# Patient Record
Sex: FEMALE | Race: WHITE | NOT HISPANIC OR LATINO | URBAN - METROPOLITAN AREA
[De-identification: names, ages, dates, MRNs, and addresses within clinical notes are randomized per-mention and may not be internally consistent; named-entity substitution may affect disease eponyms.]

---

## 2017-07-01 ENCOUNTER — OUTPATIENT (OUTPATIENT)
Dept: OUTPATIENT SERVICES | Facility: HOSPITAL | Age: 82
LOS: 1 days | End: 2017-07-01
Payer: MEDICAID

## 2017-07-18 DIAGNOSIS — R69 ILLNESS, UNSPECIFIED: ICD-10-CM

## 2017-08-01 PROCEDURE — G9001: CPT

## 2018-06-07 ENCOUNTER — NON-APPOINTMENT (OUTPATIENT)
Age: 83
End: 2018-06-07

## 2018-06-07 ENCOUNTER — APPOINTMENT (OUTPATIENT)
Dept: INTERNAL MEDICINE | Facility: CLINIC | Age: 83
End: 2018-06-07
Payer: MEDICARE

## 2018-06-07 VITALS
BODY MASS INDEX: 37.29 KG/M2 | WEIGHT: 185 LBS | HEART RATE: 88 BPM | SYSTOLIC BLOOD PRESSURE: 132 MMHG | HEIGHT: 59 IN | DIASTOLIC BLOOD PRESSURE: 79 MMHG

## 2018-06-07 DIAGNOSIS — Z00.00 ENCOUNTER FOR GENERAL ADULT MEDICAL EXAMINATION W/OUT ABNORMAL FINDINGS: ICD-10-CM

## 2018-06-07 PROCEDURE — 36415 COLL VENOUS BLD VENIPUNCTURE: CPT

## 2018-06-07 PROCEDURE — 93000 ELECTROCARDIOGRAM COMPLETE: CPT | Mod: 59

## 2018-06-07 PROCEDURE — G0438: CPT

## 2018-06-11 NOTE — REVIEW OF SYSTEMS
[Joint Pain] : joint pain [Muscle Pain] : muscle pain [Negative] : Psychiatric [FreeTextEntry9] : Joint pains

## 2018-06-11 NOTE — HISTORY OF PRESENT ILLNESS
[de-identified] : This is an 82 year old woman who presents for CPE and change in primary.  Previously seeing Dr. Hernandez last visit 2-3 weeks ago needed new primary as he retired.  \par \par Hx of rheumatoid arthritis, has been worse over the past few months.  Goes to Dr. Etienne\par ALso knee replacement last surgery. Tuboligation.  \par Right leg knee replacement last January, left knee this august.  \par \par Severe pain in knee, hand shoulders.  Had a cortisone shot for her left shoulder. \par prednisone 5mg 2 a day.\par Tramadol 50mg takes 2-3 a day.\par Triamterene HCTZ for swelling.  \par \par Gained 10-15 lbs last year.

## 2018-06-11 NOTE — ASSESSMENT
[FreeTextEntry1] : 82 year old woman with history of rheumatoid arthritis, its being getting worse lately.  Patient on prednisone however having problems with her hands and shoulder pains. Reccomended she discuss possibility of a biologic or methotrexate with her rheumatologist.  Patient gaining a fair amount of  weight would not push prednisone to too high of a dose.   Check CBC, CMP, HgA1c, Lipid profile, TSH, Vitamin D, UA, Rheumatoid factor, ESR, Ferritin.

## 2018-06-12 LAB
25(OH)D3 SERPL-MCNC: 37.5 NG/ML
ALBUMIN SERPL ELPH-MCNC: 4.1 G/DL
ALP BLD-CCNC: 51 U/L
ALT SERPL-CCNC: 9 U/L
ANION GAP SERPL CALC-SCNC: 17 MMOL/L
AST SERPL-CCNC: 21 U/L
BACTERIA UR CULT: NORMAL
BASOPHILS # BLD AUTO: 0.02 K/UL
BASOPHILS NFR BLD AUTO: 0.2 %
BILIRUB SERPL-MCNC: 0.3 MG/DL
BUN SERPL-MCNC: 33 MG/DL
CALCIUM SERPL-MCNC: 10.3 MG/DL
CHLORIDE SERPL-SCNC: 96 MMOL/L
CO2 SERPL-SCNC: 24 MMOL/L
CREAT SERPL-MCNC: 1.55 MG/DL
EOSINOPHIL # BLD AUTO: 0.07 K/UL
EOSINOPHIL NFR BLD AUTO: 0.6 %
ERYTHROCYTE [SEDIMENTATION RATE] IN BLOOD BY WESTERGREN METHOD: 38 MM/HR
FERRITIN SERPL-MCNC: 192 NG/ML
FOLATE SERPL-MCNC: >20 NG/ML
GLUCOSE SERPL-MCNC: 74 MG/DL
HBA1C MFR BLD HPLC: 5.8 %
HCT VFR BLD CALC: 46.3 %
HGB BLD-MCNC: 14.4 G/DL
IMM GRANULOCYTES NFR BLD AUTO: 0.2 %
IRON SATN MFR SERPL: 28 %
IRON SERPL-MCNC: 74 UG/DL
LYMPHOCYTES # BLD AUTO: 4.41 K/UL
LYMPHOCYTES NFR BLD AUTO: 37.4 %
MAN DIFF?: NORMAL
MCHC RBC-ENTMCNC: 28.3 PG
MCHC RBC-ENTMCNC: 31.1 GM/DL
MCV RBC AUTO: 91.1 FL
MONOCYTES # BLD AUTO: 0.91 K/UL
MONOCYTES NFR BLD AUTO: 7.7 %
NEUTROPHILS # BLD AUTO: 6.36 K/UL
NEUTROPHILS NFR BLD AUTO: 53.9 %
PLATELET # BLD AUTO: 314 K/UL
POTASSIUM SERPL-SCNC: 4.1 MMOL/L
PROT SERPL-MCNC: 7.6 G/DL
RBC # BLD: 5.08 M/UL
RBC # FLD: 13.8 %
RHEUMATOID FACT SER QL: 18 IU/ML
SAVE SPECIMEN: NORMAL
SODIUM SERPL-SCNC: 137 MMOL/L
T3FREE SERPL-MCNC: 2.2 PG/ML
T4 SERPL-MCNC: 7.3 UG/DL
TIBC SERPL-MCNC: 263 UG/DL
TSH SERPL-ACNC: 3.41 UIU/ML
UIBC SERPL-MCNC: 189 UG/DL
URATE SERPL-MCNC: 6.4 MG/DL
VIT B12 SERPL-MCNC: 802 PG/ML
WBC # FLD AUTO: 11.79 K/UL

## 2018-07-26 ENCOUNTER — APPOINTMENT (OUTPATIENT)
Dept: INTERNAL MEDICINE | Facility: CLINIC | Age: 83
End: 2018-07-26
Payer: MEDICARE

## 2018-07-26 VITALS
HEART RATE: 87 BPM | SYSTOLIC BLOOD PRESSURE: 174 MMHG | BODY MASS INDEX: 41.75 KG/M2 | WEIGHT: 183 LBS | DIASTOLIC BLOOD PRESSURE: 102 MMHG | HEIGHT: 55.5 IN

## 2018-07-26 VITALS — DIASTOLIC BLOOD PRESSURE: 94 MMHG | SYSTOLIC BLOOD PRESSURE: 156 MMHG

## 2018-07-26 PROCEDURE — 99214 OFFICE O/P EST MOD 30 MIN: CPT

## 2018-07-27 NOTE — ASSESSMENT
[Patient Optimized for Surgery] : Patient optimized for surgery [FreeTextEntry4] : This is an 83 year old woman with severe rheumatoid arthritis, she presents for pre-surgical medical clearance prior to a left knee replacement. No history of coronary artery disease or diabetes.  Pre-op bloodwork looks fin,e CBC, CMP WNL EKG unremarkable.  Patient has no cardiac history, and had a workup last year that was used to clear her for the right knee replacement. No complications were noted and patient did well, however the results are not available to me.  Given that she has no prior heart history she is low risk and this would be unnecessary. Patient is medically cleared and optimized for upcoming left knee replacement after starting atenolol 50mg daily for some hypertension that we have only recently noted on today visit. May exacerbated by knee pain. Will re-evaluate after surgery to see if she still needs it.

## 2018-07-27 NOTE — HISTORY OF PRESENT ILLNESS
[Coronary Artery Disease] : no coronary artery disease [Diabetes] : no diabetes [Sleep Apnea] : no sleep apnea [Previous Adverse Anesthesia Reaction] : no previous adverse anesthesia reaction [FreeTextEntry1] : Left knee replacement.   [FreeTextEntry2] : 8/2/18 [FreeTextEntry3] : Dr. Herve Echavarria [FreeTextEntry4] : This is an 83 year old woman who presents for pre-operative clearance prior to a left knee replacement.  Going for the left knee replacement surgery with Dr. Herve Echavarria on 8/2/18.  Right side was done last year has since recovered.  At he time she had a stress test and an echocardiogram as part of the pre-op workup.  Those records are now unavailable to us, her physician had since retired.  \par She has a histroy of hypertension on triamterene HCTZ 37.5/25 and is on prednisone for rheumatoid arthritis.  \par \par Dr. Edmonds

## 2018-07-27 NOTE — PHYSICAL EXAM

## 2018-09-01 ENCOUNTER — OUTPATIENT (OUTPATIENT)
Dept: OUTPATIENT SERVICES | Facility: HOSPITAL | Age: 83
LOS: 1 days | End: 2018-09-01
Payer: MEDICARE

## 2018-09-01 PROCEDURE — G9001: CPT

## 2018-09-05 DIAGNOSIS — Z71.89 OTHER SPECIFIED COUNSELING: ICD-10-CM

## 2018-09-21 ENCOUNTER — RX RENEWAL (OUTPATIENT)
Age: 83
End: 2018-09-21

## 2019-08-27 ENCOUNTER — NON-APPOINTMENT (OUTPATIENT)
Age: 84
End: 2019-08-27

## 2019-08-27 ENCOUNTER — APPOINTMENT (OUTPATIENT)
Dept: CARDIOLOGY | Facility: CLINIC | Age: 84
End: 2019-08-27
Payer: MEDICARE

## 2019-08-27 VITALS
DIASTOLIC BLOOD PRESSURE: 82 MMHG | BODY MASS INDEX: 42.9 KG/M2 | WEIGHT: 188 LBS | SYSTOLIC BLOOD PRESSURE: 174 MMHG | HEART RATE: 75 BPM | HEIGHT: 55.5 IN | OXYGEN SATURATION: 96 %

## 2019-08-27 DIAGNOSIS — Z82.49 FAMILY HISTORY OF ISCHEMIC HEART DISEASE AND OTHER DISEASES OF THE CIRCULATORY SYSTEM: ICD-10-CM

## 2019-08-27 PROCEDURE — 99205 OFFICE O/P NEW HI 60 MIN: CPT

## 2019-08-27 PROCEDURE — 93000 ELECTROCARDIOGRAM COMPLETE: CPT

## 2019-08-27 RX ORDER — METOPROLOL SUCCINATE 25 MG/1
25 TABLET, EXTENDED RELEASE ORAL
Qty: 30 | Refills: 0 | Status: ACTIVE | COMMUNITY
Start: 2019-08-19

## 2019-09-10 ENCOUNTER — APPOINTMENT (OUTPATIENT)
Dept: CARDIOLOGY | Facility: CLINIC | Age: 84
End: 2019-09-10

## 2019-09-12 ENCOUNTER — APPOINTMENT (OUTPATIENT)
Dept: CARDIOLOGY | Facility: CLINIC | Age: 84
End: 2019-09-12

## 2019-09-17 ENCOUNTER — APPOINTMENT (OUTPATIENT)
Dept: CARDIOLOGY | Facility: CLINIC | Age: 84
End: 2019-09-17
Payer: MEDICARE

## 2019-09-17 VITALS
SYSTOLIC BLOOD PRESSURE: 140 MMHG | HEART RATE: 79 BPM | DIASTOLIC BLOOD PRESSURE: 70 MMHG | HEIGHT: 55.5 IN | BODY MASS INDEX: 42.9 KG/M2 | WEIGHT: 188 LBS | OXYGEN SATURATION: 96 %

## 2019-09-17 DIAGNOSIS — K21.9 GASTRO-ESOPHAGEAL REFLUX DISEASE W/OUT ESOPHAGITIS: ICD-10-CM

## 2019-09-17 PROCEDURE — 99214 OFFICE O/P EST MOD 30 MIN: CPT

## 2019-09-17 NOTE — REVIEW OF SYSTEMS
[Recent Weight Gain (___ Lbs)] : recent [unfilled] ~Ulb weight gain [Joint Pain] : joint pain [Negative] : Genitourinary [Fever] : no fever [Headache] : no headache [Chills] : no chills [Blurry Vision] : no blurred vision [Feeling Fatigued] : not feeling fatigued [Seeing Double (Diplopia)] : no diplopia [Skin: A Rash] : no rash: [Dizziness] : no dizziness [Depression] : no depression [Excessive Thirst] : no polydipsia [Anxiety] : no anxiety [Easy Bleeding] : no tendency for easy bleeding [Easy Bruising] : no tendency for easy bruising [FreeTextEntry2] : constipation

## 2019-09-17 NOTE — HISTORY OF PRESENT ILLNESS
[FreeTextEntry1] : I saw Marciano Silver in the office today for cardiac evaluation. She is an 84-year-old white female who has a prior history of hypertension, hyperlipidemia, and elevated A1C. She also has a history of rheumatoid arthritis which has been disabling. He is status post bilateral knee replacement and has been taking low-dose prednisone. She denies any history of smoking but does have 2 sons one heart attacks at 56 and 60. She has no known history of heart disease.\par \par 10 days ago she was admitted to [facility] with confusion and found to have a TIA/CVA. CT scan showed bilateral infarcts of the basal ganglia. She was put on high-dose atorvastatin for her cholesterol, and Toprol-XL 25 mg once a day for hypertension. Currently she was taking one baby aspirin a day and she was discharged on the same dose. 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 There are no acute changes. The patient denies any chest pain, shortness of breath, palpitations, or lightheadedness.

## 2019-09-17 NOTE — REVIEW OF SYSTEMS
[Joint Pain] : joint pain [Negative] : Genitourinary [Shortness Of Breath] : no shortness of breath [Palpitations] : no palpitations [Chest Pain] : no chest pain [Abdominal Pain] : no abdominal pain [Heartburn] : no heartburn [Dysphagia] : no dysphagia [Skin: A Rash] : no rash: [Dizziness] : no dizziness [Depression] : no depression [Anxiety] : no anxiety [Excessive Thirst] : no polydipsia [Easy Bleeding] : no tendency for easy bleeding [Easy Bruising] : no tendency for easy bruising

## 2019-09-17 NOTE — DISCUSSION/SUMMARY
[FreeTextEntry1] : The patient is hemodynamically stable. Heart rhythm is regular and blood pressure is well-controlled. Volume status is stable. She is having no signs or symptoms of active heart disease\par \par She has the drain in the gallbladder. She will stay on her present medication. With her severe nausea and cutting back the iron to twice a day. She otherwise will stay on her present medication. If she has any problems or symptoms she will call me. I'm going to try to get the records from [facility].\par \par If all is well I will see her in 2 weeks. We went over her hospitalization and I answered all of their questions.

## 2019-09-17 NOTE — REASON FOR VISIT
[Follow-Up - Clinic] : a clinic follow-up of [Atrial Fibrillation] : atrial fibrillation [Hyperlipidemia] : hyperlipidemia [Hypertension] : hypertension [FreeTextEntry1] : CVA

## 2019-09-17 NOTE — REASON FOR VISIT
[Initial Evaluation] : an initial evaluation of [Hypertension] : hypertension [FreeTextEntry1] : Elevated A1C, CVA

## 2019-09-17 NOTE — DISCUSSION/SUMMARY
[FreeTextEntry1] : This is an 84-year-old white female who has hypertension which had not been treated before, hyperlipidemia not treated before, who had a TIA/CVA with good recovery. She has bilateral infarct on CT scan and ophthalmology feels she has bilateral emboli. Going to need to rule out any cardioembolic source.\par \par She is going to increase her aspirin to 2 a day and I will contact the neurologist. We'll try to get records from [place] to find what was done. I would see her in 2 weeks at which time we will reevaluate her situation including medication and possible further testing. We may need to rule out cardiac dysrhythmia. I presume they did an echo and a carotid Doppler at [place].\par \par If the patient has any additional symptoms or problems they will call me. Otherwise I would see her in 2 weeks.\par \par This was discussed in detail with the patient and her son and answered all their questions.

## 2019-09-17 NOTE — PHYSICAL EXAM
[General Appearance - Well Developed] : well developed [Normal Appearance] : normal appearance [Well Groomed] : well groomed [General Appearance - Well Nourished] : well nourished [No Deformities] : no deformities [General Appearance - In No Acute Distress] : no acute distress [Normal Conjunctiva] : the conjunctiva exhibited no abnormalities [Normal Oral Mucosa] : normal oral mucosa [Normal Jugular Venous A Waves Present] : normal jugular venous A waves present [Normal Jugular Venous V Waves Present] : normal jugular venous V waves present [No Jugular Venous Castro A Waves] : no jugular venous castro A waves [Not Palpable] : not palpable [Normal Rate] : normal [Normal S1] : normal S1 [Normal S2] : normal S2 [No Murmur] : no murmurs heard [2+] : left 2+ [1+] : left 1+ [No Abnormalities] : the abdominal aorta was not enlarged and no bruit was heard [No Pitting Edema] : no pitting edema present [Respiration, Rhythm And Depth] : normal respiratory rhythm and effort [Auscultation Breath Sounds / Voice Sounds] : lungs were clear to auscultation bilaterally [Exaggerated Use Of Accessory Muscles For Inspiration] : no accessory muscle use [Abdomen Soft] : soft [Bowel Sounds] : normal bowel sounds [Abnormal Walk] : normal gait [Abdomen Tenderness] : non-tender [Cyanosis, Localized] : no localized cyanosis [Nail Clubbing] : no clubbing of the fingernails [Skin Color & Pigmentation] : normal skin color and pigmentation [] : no rash [Skin Turgor] : normal skin turgor [Oriented To Time, Place, And Person] : oriented to person, place, and time [Impaired Insight] : insight and judgment were intact [No Anxiety] : not feeling anxious [S3] : no S3 [S4] : no S4 [Right Carotid Bruit] : no bruit heard over the right carotid [Left Carotid Bruit] : no bruit heard over the left carotid [Right Femoral Bruit] : no bruit heard over the right femoral artery [Left Femoral Bruit] : no bruit heard over the left femoral artery

## 2019-09-17 NOTE — PHYSICAL EXAM
[Normal Appearance] : normal appearance [General Appearance - Well Developed] : well developed [Well Groomed] : well groomed [General Appearance - Well Nourished] : well nourished [No Deformities] : no deformities [General Appearance - In No Acute Distress] : no acute distress [Normal Jugular Venous A Waves Present] : normal jugular venous A waves present [Normal Conjunctiva] : the conjunctiva exhibited no abnormalities [Normal Jugular Venous V Waves Present] : normal jugular venous V waves present [No Jugular Venous Castro A Waves] : no jugular venous castro A waves [Respiration, Rhythm And Depth] : normal respiratory rhythm and effort [Exaggerated Use Of Accessory Muscles For Inspiration] : no accessory muscle use [Auscultation Breath Sounds / Voice Sounds] : lungs were clear to auscultation bilaterally [Normal Rate] : normal [Normal S1] : normal S1 [Normal S2] : normal S2 [No Murmur] : no murmurs heard [2+] : Carotid: right 2+ [1+] : left 1+ [No Pitting Edema] : no pitting edema present [No Abnormalities] : the abdominal aorta was not enlarged and no bruit was heard [Bowel Sounds] : normal bowel sounds [Abdomen Soft] : soft [Abdomen Tenderness] : non-tender [Cyanosis, Localized] : no localized cyanosis [Nail Clubbing] : no clubbing of the fingernails [Skin Turgor] : normal skin turgor [Skin Color & Pigmentation] : normal skin color and pigmentation [Oriented To Time, Place, And Person] : oriented to person, place, and time [] : no rash [No Anxiety] : not feeling anxious [Impaired Insight] : insight and judgment were intact [S3] : no S3 [S4] : no S4 [Right Carotid Bruit] : no bruit heard over the right carotid [Left Carotid Bruit] : no bruit heard over the left carotid [Left Femoral Bruit] : no bruit heard over the left femoral artery [Right Femoral Bruit] : no bruit heard over the right femoral artery [FreeTextEntry1] : Walks with cane

## 2019-09-17 NOTE — HISTORY OF PRESENT ILLNESS
[FreeTextEntry1] : I saw Marciano Silver in the office today for cardiac followup. She is an 84-year-old white female was a history of hypertension, hyperlipidemia, and elevated A1c. She also has a history of rheumatoid arthritis which has been disabling. She had bilateral knee replacement and has been on low-dose prednisone. She denies any history of smoking but does have 2 sons, one with a heart attack at age 56 and one at 60. She has no known history of heart disease.\par \par She was admitted to [facility] in August with confusion found to have a TIA/CVA. CT scan showed bilateral infarcts of the basal ganglia. She was put on high-dose atorvastatin for cholesterol and Toprol 25 mg once a day for hypertension. She was also taking one low-dose aspirin a day.\par \par She saw an ophthalmologist who felt that she might have bilateral emboli in her retina.\par \par Review of records from [place]. Echo demonstrated ejection fraction of 55-60% with LVH. Carotid Doppler showed 50-69% right internal carotid artery and less than 50% of left internal carotid artery. MRI scan showed a left occipital infarction in the MCA distribution\par \par She was admitted to [facility] last week with acute gallbladder. Monitor showed she was going in and out of atrial fibrillation and she was seen by electrophysiology. The gallbladder was drained and she was placed on beta blocker and anticoagulation. She is now back for reevaluation.\par \par Feeling nauseous and is very debilitated by her rheumatoid arthritis. She is having no chest pain, shortness of breath, palpitation

## 2019-09-20 ENCOUNTER — MOBILE ON CALL (OUTPATIENT)
Age: 84
End: 2019-09-20

## 2019-09-27 ENCOUNTER — MOBILE ON CALL (OUTPATIENT)
Age: 84
End: 2019-09-27

## 2019-10-03 ENCOUNTER — APPOINTMENT (OUTPATIENT)
Dept: CARDIOLOGY | Facility: CLINIC | Age: 84
End: 2019-10-03
Payer: MEDICARE

## 2019-10-03 VITALS
BODY MASS INDEX: 41.52 KG/M2 | OXYGEN SATURATION: 98 % | HEART RATE: 79 BPM | HEIGHT: 55.5 IN | DIASTOLIC BLOOD PRESSURE: 94 MMHG | SYSTOLIC BLOOD PRESSURE: 164 MMHG | WEIGHT: 182 LBS

## 2019-10-03 DIAGNOSIS — R06.09 OTHER FORMS OF DYSPNEA: ICD-10-CM

## 2019-10-03 DIAGNOSIS — Z01.818 ENCOUNTER FOR OTHER PREPROCEDURAL EXAMINATION: ICD-10-CM

## 2019-10-03 PROCEDURE — 99214 OFFICE O/P EST MOD 30 MIN: CPT

## 2019-10-15 ENCOUNTER — APPOINTMENT (OUTPATIENT)
Dept: CARDIOLOGY | Facility: CLINIC | Age: 84
End: 2019-10-15
Payer: MEDICARE

## 2019-10-15 PROCEDURE — 93015 CV STRESS TEST SUPVJ I&R: CPT

## 2019-10-15 PROCEDURE — A9500: CPT

## 2019-10-15 PROCEDURE — 78452 HT MUSCLE IMAGE SPECT MULT: CPT

## 2019-10-22 NOTE — PHYSICAL EXAM
[Normal Appearance] : normal appearance [General Appearance - Well Developed] : well developed [Well Groomed] : well groomed [General Appearance - Well Nourished] : well nourished [General Appearance - In No Acute Distress] : no acute distress [No Deformities] : no deformities [Normal Conjunctiva] : the conjunctiva exhibited no abnormalities [Normal Jugular Venous V Waves Present] : normal jugular venous V waves present [Normal Jugular Venous A Waves Present] : normal jugular venous A waves present [No Jugular Venous Castro A Waves] : no jugular venous castro A waves [Respiration, Rhythm And Depth] : normal respiratory rhythm and effort [Exaggerated Use Of Accessory Muscles For Inspiration] : no accessory muscle use [Bowel Sounds] : normal bowel sounds [Auscultation Breath Sounds / Voice Sounds] : lungs were clear to auscultation bilaterally [Abdomen Tenderness] : non-tender [Abdomen Soft] : soft [Nail Clubbing] : no clubbing of the fingernails [Cyanosis, Localized] : no localized cyanosis [Skin Color & Pigmentation] : normal skin color and pigmentation [] : no rash [Skin Turgor] : normal skin turgor [Oriented To Time, Place, And Person] : oriented to person, place, and time [Impaired Insight] : insight and judgment were intact [No Anxiety] : not feeling anxious [Normal Rate] : normal [Normal S1] : normal S1 [Normal S2] : normal S2 [No Murmur] : no murmurs heard [2+] : left 2+ [1+] : left 1+ [No Abnormalities] : the abdominal aorta was not enlarged and no bruit was heard [No Pitting Edema] : no pitting edema present [FreeTextEntry1] : Walks with cane [S3] : no S3 [S4] : no S4 [Right Carotid Bruit] : no bruit heard over the right carotid [Left Carotid Bruit] : no bruit heard over the left carotid [Right Femoral Bruit] : no bruit heard over the right femoral artery [Left Femoral Bruit] : no bruit heard over the left femoral artery

## 2019-10-22 NOTE — REVIEW OF SYSTEMS
[Joint Pain] : joint pain [Negative] : Genitourinary [Shortness Of Breath] : no shortness of breath [Chest Pain] : no chest pain [Palpitations] : no palpitations [Abdominal Pain] : no abdominal pain [Heartburn] : no heartburn [Dysphagia] : no dysphagia [Skin: A Rash] : no rash: [Dizziness] : no dizziness [Depression] : no depression [Anxiety] : no anxiety [Excessive Thirst] : no polydipsia [Easy Bleeding] : no tendency for easy bleeding [Easy Bruising] : no tendency for easy bruising

## 2019-10-22 NOTE — DISCUSSION/SUMMARY
[FreeTextEntry1] : The patient has multiple risk factors for heart disease including hyperlipidemia and hypertension. She has moderate to severe carotid disease and is complaining of dyspnea on exertion. She'll go for chemical nuclear stress test to make sure that there is no significant coronary disease prior to a cholecystectomy. Once she undergoes gallbladder surgery then she'll come back and we'll try to manage her risk factors as best we can.Hopefully she can start walking and lose some weight.\par \par This was discussed with the patient and her son I answered all of her questions.

## 2019-10-22 NOTE — HISTORY OF PRESENT ILLNESS
[FreeTextEntry1] : I saw Marciano Silver in the office today for cardiac followup. She is an 84-year-old white female was a history of hypertension, hyperlipidemia, and elevated A1c. She also has a history of rheumatoid arthritis which has been disabling. She had bilateral knee replacement and has been on low-dose prednisone. She denies any history of smoking but does have 2 sons, one with a heart attack at age 56 and one at 60. She has no known history of heart disease.\par \par She was admitted to [facility] in August with confusion found to have a TIA/CVA. CT scan showed bilateral infarcts of the basal ganglia. She was put on high-dose atorvastatin for cholesterol and Toprol 25 mg once a day for hypertension. She was also taking one low-dose aspirin a day.\par \par She saw an ophthalmologist who felt that she might have bilateral emboli in her retina.\par \par Review of records from [place]. Echo demonstrated ejection fraction of 55-60% with LVH. Carotid Doppler showed 50-69% right internal carotid artery and less than 50% of left internal carotid artery. 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 Monitor showed she was going in and out of atrial fibrillation and she was seen by electrophysiology. The gallbladder was drained and she was placed on beta blocker and anticoagulation. She is now back for reevaluation.\par \par Patient is feeling better. She is less nauseous. She does difficulty walking which is a chronic condition because her rheumatoid arthritis. She does note some dyspnea when she tries to walk. She has no chest discomfort. She is to be scheduled for a cholecystectomy sometime later this month.

## 2019-11-05 ENCOUNTER — APPOINTMENT (OUTPATIENT)
Dept: SURGERY | Facility: CLINIC | Age: 84
End: 2019-11-05
Payer: MEDICARE

## 2019-11-05 VITALS
WEIGHT: 180 LBS | RESPIRATION RATE: 16 BRPM | TEMPERATURE: 97.6 F | DIASTOLIC BLOOD PRESSURE: 81 MMHG | OXYGEN SATURATION: 97 % | SYSTOLIC BLOOD PRESSURE: 136 MMHG | BODY MASS INDEX: 30.73 KG/M2 | HEIGHT: 64 IN | HEART RATE: 65 BPM

## 2019-11-05 DIAGNOSIS — Z63.4 DISAPPEARANCE AND DEATH OF FAMILY MEMBER: ICD-10-CM

## 2019-11-05 DIAGNOSIS — Z78.9 OTHER SPECIFIED HEALTH STATUS: ICD-10-CM

## 2019-11-05 DIAGNOSIS — K60.0 ACUTE ANAL FISSURE: ICD-10-CM

## 2019-11-05 PROCEDURE — 99204 OFFICE O/P NEW MOD 45 MIN: CPT

## 2019-11-05 PROCEDURE — 46600 DIAGNOSTIC ANOSCOPY SPX: CPT

## 2019-11-05 RX ORDER — MULTIVITAMIN
TABLET ORAL
Refills: 0 | Status: ACTIVE | COMMUNITY

## 2019-11-05 RX ORDER — SULFAMETHOXAZOLE AND TRIMETHOPRIM 800; 160 MG/1; MG/1
800-160 TABLET ORAL
Qty: 10 | Refills: 0 | Status: DISCONTINUED | COMMUNITY
Start: 2019-09-15 | End: 2019-11-05

## 2019-11-05 RX ORDER — SUCRALFATE 1 G/1
1 TABLET ORAL TWICE DAILY
Qty: 60 | Refills: 3 | Status: DISCONTINUED | COMMUNITY
Start: 2019-09-15 | End: 2019-11-05

## 2019-11-05 RX ORDER — LISINOPRIL 5 MG/1
5 TABLET ORAL
Qty: 30 | Refills: 0 | Status: DISCONTINUED | COMMUNITY
Start: 2019-09-15 | End: 2019-11-05

## 2019-11-05 SDOH — SOCIAL STABILITY - SOCIAL INSECURITY: DISSAPEARANCE AND DEATH OF FAMILY MEMBER: Z63.4

## 2019-11-05 NOTE — CONSULT LETTER
[Dear  ___] : Dear  [unfilled], [Consult Letter:] : I had the pleasure of evaluating your patient, [unfilled]. [Please see my note below.] : Please see my note below. [Consult Closing:] : Thank you very much for allowing me to participate in the care of this patient.  If you have any questions, please do not hesitate to contact me. [Sincerely,] : Sincerely, [DrCarmela  ___] : Dr. DUKES [FreeTextEntry2] : Dr YATES AARON  [FreeTextEntry3] : Jovita James M.D., F.A.C.S., F.[id number].[id number].C.R.S.\par Assistant Professor of Surgery\par [employer] at [facility], [place]\par \par

## 2019-11-05 NOTE — PHYSICAL EXAM
[FreeTextEntry1] : This is an 84 -year-old well-developed female in no apparent distress.\par \par HEENT normocephalic, anicteric, external ears normal bilaterally, EOMs intact.\par \par Abdomen soft, nontender, nondistended, no masses. No hepatosplenomegaly. Perc bettie tube in place with bile. \par \par No inguinal lymphadenopathy bilaterally.\par \par Examination of the perineum reveals very small external hemorrhoids. Digital rectal examination reveals sphincter spasm. \par Anoscopy reveals small, non-inflamed internal hemorrhoids and a friable anterior midline fissure in the mid anal canal. \par \par Neuro-cranial nerves grossly intact. Normal gait.\par \par Psychiatric-oriented to time place and person. Good understanding of conversation.\par

## 2019-11-05 NOTE — HISTORY OF PRESENT ILLNESS
[FreeTextEntry1] : Marciano is an 83 y/o female with rectal bleeding and pain. The bleeding started three days ago, after passing a hard stool. BRB is on the TP. + anorectal pain with BM, pain does not linger. Typically has normal, soft BMs. She is currently on iron. She is not aware of severe anemia issues. \par She has remote h/o hemorrhoidectomy (40 years ago).\par She was hospitalized at [facility] in October for acute cholecystitis treated with a perc bettie tube. She is scheduled for lap cholecystomy on Friday with Dr Torres. Yesterday was her last dose of Xarelto. Now on baby ASA. \par Denies family hx of colon cancer. Reports last colonoscopy over ten years ago.

## 2019-11-05 NOTE — ASSESSMENT
[FreeTextEntry1] : 83 yo female with acute anal fissure. I discussed the pathogenesis of the fissure and prescribed NTG oint for the fissure and sphincter spasm and Recticare cream for symptomatic relief.\par Start Miralax and use daily until the surgery - continue thereafter if OK with her General Surgeon. \par Stop the iron for now. Check with PMD if she needs to restart it at some point. \par I instructed the pt to call my office if she has no relief in  symptoms after 7-10 days of tx to arrange for Botox. \par RTO as needed.\par \par \par

## 2019-11-05 NOTE — REASON FOR VISIT
[Consultation] : a consultation visit [Family Member] : family member [FreeTextEntry1] : Rectal bleeding and pain

## 2019-11-19 ENCOUNTER — APPOINTMENT (OUTPATIENT)
Dept: CARDIOLOGY | Facility: CLINIC | Age: 84
End: 2019-11-19
Payer: MEDICARE

## 2019-11-19 VITALS
HEIGHT: 64 IN | BODY MASS INDEX: 30.73 KG/M2 | OXYGEN SATURATION: 96 % | DIASTOLIC BLOOD PRESSURE: 86 MMHG | SYSTOLIC BLOOD PRESSURE: 161 MMHG | HEART RATE: 73 BPM | WEIGHT: 180 LBS

## 2019-11-19 PROCEDURE — 99214 OFFICE O/P EST MOD 30 MIN: CPT

## 2019-11-19 NOTE — DISCUSSION/SUMMARY
[FreeTextEntry1] : The patient is stable with good blood pressure and heart rate. There is no signs or symptoms of active heart disease.\par \par She'll stay on her present medication for a month and reevaluate her. She doing well I will reduce the dose of amiodarone to 100 mg once a day. I am not sure if she need this meds in the long run. Being on aspirin, prednisone, and Xarelto she will start Prilosec 20 mg once a day to protect her stomach. If she has any problems she will call me.\par \par If all is well I will see her in one month.

## 2019-11-19 NOTE — REVIEW OF SYSTEMS
[Joint Pain] : joint pain [Negative] : Genitourinary [Shortness Of Breath] : no shortness of breath [Chest Pain] : no chest pain [Palpitations] : no palpitations [Abdominal Pain] : no abdominal pain [Heartburn] : no heartburn [Skin: A Rash] : no rash: [Dysphagia] : no dysphagia [Dizziness] : no dizziness [Depression] : no depression [Anxiety] : no anxiety [Excessive Thirst] : no polydipsia [Easy Bleeding] : no tendency for easy bleeding [Easy Bruising] : no tendency for easy bruising

## 2019-11-19 NOTE — HISTORY OF PRESENT ILLNESS
[FreeTextEntry1] : I saw Marciano Silver in the office today for cardiac followup. She is an 84-year-old white female was a history of hypertension, hyperlipidemia, and elevated A1c. She also has a history of rheumatoid arthritis which has been disabling. She had bilateral knee replacement and has been on low-dose prednisone. She denies any history of smoking but does have 2 sons, one with a heart attack at age 56 and one at 60. She has no known history of heart disease.\par \par She was admitted to [facility] in August with confusion found to have a TIA/CVA. CT scan showed bilateral infarcts of the basal ganglia. She was put on high-dose atorvastatin for cholesterol and Toprol 25 mg once a day for hypertension. She was also taking one low-dose aspirin a day.\par \par She saw an ophthalmologist who felt that she might have bilateral emboli in her retina.\par \par Review of records from [place]. Echo demonstrated ejection fraction of 55-60% with LVH. Carotid Doppler showed 50-69% right internal carotid artery and less than 50% of left internal carotid artery. 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 Monitor showed she was going in and out of atrial fibrillation and she was seen by electrophysiology. The gallbladder was drained and she was placed on beta blocker and anticoagulation. \par \par The patient underwent cholecystectomy. This was complicated by atrial fibrillation with rapid ventricular rate. She was placed on amiodarone with conversion to sinus rhythm. She is now taking amiodarone 200 mg once a day, and is still on metoprolol 25 mg once a day. She was taking oxycodone postoperatively for pain and she's now been constipated and using MiraLax. She still is on low dose aspirin for her carotid disease as well as prednisone 5 mg once a day for her arthritis\par \par She is walking without any difficulty. She has no chest pain, shortness of breath, or palpitation.\par \par ECG dated 12/11/19 demonstrated sinus rhythm without acute change.

## 2019-11-19 NOTE — PHYSICAL EXAM
[General Appearance - Well Developed] : well developed [Normal Appearance] : normal appearance [Well Groomed] : well groomed [General Appearance - Well Nourished] : well nourished [No Deformities] : no deformities [General Appearance - In No Acute Distress] : no acute distress [Normal Conjunctiva] : the conjunctiva exhibited no abnormalities [Normal Jugular Venous V Waves Present] : normal jugular venous V waves present [Normal Jugular Venous A Waves Present] : normal jugular venous A waves present [No Jugular Venous Castro A Waves] : no jugular venous castro A waves [Exaggerated Use Of Accessory Muscles For Inspiration] : no accessory muscle use [Respiration, Rhythm And Depth] : normal respiratory rhythm and effort [Auscultation Breath Sounds / Voice Sounds] : lungs were clear to auscultation bilaterally [Abdomen Soft] : soft [Bowel Sounds] : normal bowel sounds [Abdomen Tenderness] : non-tender [Cyanosis, Localized] : no localized cyanosis [Skin Color & Pigmentation] : normal skin color and pigmentation [Nail Clubbing] : no clubbing of the fingernails [Skin Turgor] : normal skin turgor [Oriented To Time, Place, And Person] : oriented to person, place, and time [] : no rash [No Anxiety] : not feeling anxious [Impaired Insight] : insight and judgment were intact [Normal Rate] : normal [Normal S1] : normal S1 [Normal S2] : normal S2 [No Murmur] : no murmurs heard [2+] : Carotid: right 2+ [1+] : left 1+ [No Abnormalities] : the abdominal aorta was not enlarged and no bruit was heard [No Pitting Edema] : no pitting edema present [FreeTextEntry1] : Walks with cane [S3] : no S3 [S4] : no S4 [Right Carotid Bruit] : no bruit heard over the right carotid [Left Carotid Bruit] : no bruit heard over the left carotid [Right Femoral Bruit] : no bruit heard over the right femoral artery [Left Femoral Bruit] : no bruit heard over the left femoral artery

## 2019-12-23 ENCOUNTER — APPOINTMENT (OUTPATIENT)
Dept: CARDIOLOGY | Facility: CLINIC | Age: 84
End: 2019-12-23
Payer: MEDICARE

## 2019-12-23 VITALS
OXYGEN SATURATION: 98 % | DIASTOLIC BLOOD PRESSURE: 81 MMHG | HEART RATE: 66 BPM | BODY MASS INDEX: 31.58 KG/M2 | SYSTOLIC BLOOD PRESSURE: 146 MMHG | WEIGHT: 185 LBS | HEIGHT: 64 IN

## 2019-12-23 PROCEDURE — 99214 OFFICE O/P EST MOD 30 MIN: CPT

## 2019-12-23 RX ORDER — OLOPATADINE HYDROCHLORIDE 2 MG/ML
0.2 SOLUTION OPHTHALMIC
Qty: 8 | Refills: 0 | Status: ACTIVE | COMMUNITY
Start: 2019-11-20

## 2019-12-23 RX ORDER — METHOTREXATE 2.5 MG/1
2.5 TABLET ORAL
Qty: 48 | Refills: 0 | Status: ACTIVE | COMMUNITY
Start: 2019-09-17

## 2019-12-23 RX ORDER — PREDNISONE 10 MG/1
10 TABLET ORAL
Refills: 0 | Status: ACTIVE | COMMUNITY

## 2019-12-23 NOTE — REVIEW OF SYSTEMS
[Joint Pain] : joint pain [Negative] : Eyes [Shortness Of Breath] : no shortness of breath [Chest Pain] : no chest pain [Palpitations] : no palpitations [Abdominal Pain] : no abdominal pain [Heartburn] : no heartburn [Skin: A Rash] : no rash: [Dysphagia] : no dysphagia [Dizziness] : no dizziness [Anxiety] : no anxiety [Depression] : no depression [Excessive Thirst] : no polydipsia [Easy Bleeding] : no tendency for easy bleeding [Easy Bruising] : no tendency for easy bruising

## 2019-12-23 NOTE — HISTORY OF PRESENT ILLNESS
[FreeTextEntry1] : I saw Marciano Silver in the office today for cardiac followup. She is an 84-year-old white female was a history of hypertension, hyperlipidemia, and elevated A1c. She also has a history of rheumatoid arthritis which has been disabling. She had bilateral knee replacement and has been on low-dose prednisone. She denies any history of smoking but does have 2 sons, one with a heart attack at age 56 and one at 60. She has no known history of heart disease.\par \par She was admitted to [facility] in August with confusion found to have a TIA/CVA. CT scan showed bilateral infarcts of the basal ganglia. She was put on high-dose atorvastatin for cholesterol and Toprol 25 mg once a day for hypertension. She was also taking one low-dose aspirin a day.\par \par She saw an ophthalmologist who felt that she might have bilateral emboli in her retina.\par \par Review of records from [place]. Echo demonstrated ejection fraction of 55-60% with LVH. Carotid Doppler showed 50-69% right internal carotid artery and less than 50% of left internal carotid artery. 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 Monitor showed she was going in and out of atrial fibrillation and she was seen by electrophysiology. The gallbladder was drained and she was placed on beta blocker and anticoagulation. \par \par The patient underwent cholecystectomy. This was complicated by atrial fibrillation with rapid ventricular rate. She was placed on amiodarone with conversion to sinus rhythm. She is now taking amiodarone 200 mg once a day, and is still on metoprolol 25 mg once a day. She was taking oxycodone postoperatively for pain and she's now been constipated and using MiraLax. She still is on low dose aspirin for her carotid disease as well as prednisone 5 mg once a day for her arthritis\par \par She is walking without any difficulty. She has no chest pain, shortness of breath, or palpitation. Unfortunately the patient is starting to gain back the weight that she has lost from her surgery.\par \par

## 2019-12-23 NOTE — REASON FOR VISIT
[Follow-Up - Clinic] : a clinic follow-up of [Hyperlipidemia] : hyperlipidemia [Atrial Fibrillation] : atrial fibrillation [Hypertension] : hypertension [FreeTextEntry1] : CVA

## 2019-12-23 NOTE — DISCUSSION/SUMMARY
[FreeTextEntry1] : The patient's cardiac status is stable. She is maintaining sinus rhythm on amiodarone. We did discuss the toxicity and she will go for a breathing test. Also appreciate a copy of blood work. Screening thyroid testing and liver testing every 6 months.\par \par She needs to start working on her weight. She did try to walk and eat less and try to lose weight.\par \par If all is well I will see her in 2 months at that time we may cut her amiodarone down to 100 mg a day.

## 2019-12-23 NOTE — PHYSICAL EXAM
[General Appearance - Well Developed] : well developed [Normal Appearance] : normal appearance [General Appearance - Well Nourished] : well nourished [No Deformities] : no deformities [Well Groomed] : well groomed [Normal Conjunctiva] : the conjunctiva exhibited no abnormalities [General Appearance - In No Acute Distress] : no acute distress [Normal Jugular Venous V Waves Present] : normal jugular venous V waves present [Normal Jugular Venous A Waves Present] : normal jugular venous A waves present [No Jugular Venous Castro A Waves] : no jugular venous castro A waves [Respiration, Rhythm And Depth] : normal respiratory rhythm and effort [Exaggerated Use Of Accessory Muscles For Inspiration] : no accessory muscle use [Auscultation Breath Sounds / Voice Sounds] : lungs were clear to auscultation bilaterally [Abdomen Soft] : soft [Bowel Sounds] : normal bowel sounds [Abdomen Tenderness] : non-tender [Cyanosis, Localized] : no localized cyanosis [Nail Clubbing] : no clubbing of the fingernails [Skin Color & Pigmentation] : normal skin color and pigmentation [Skin Turgor] : normal skin turgor [] : no rash [Impaired Insight] : insight and judgment were intact [Oriented To Time, Place, And Person] : oriented to person, place, and time [No Anxiety] : not feeling anxious [Normal S2] : normal S2 [Normal S1] : normal S1 [Normal Rate] : normal [No Murmur] : no murmurs heard [2+] : Carotid: right 2+ [1+] : right 1+ [No Abnormalities] : the abdominal aorta was not enlarged and no bruit was heard [No Pitting Edema] : no pitting edema present [FreeTextEntry1] : Walks with cane [S3] : no S3 [S4] : no S4 [Right Carotid Bruit] : no bruit heard over the right carotid [Left Femoral Bruit] : no bruit heard over the left femoral artery [Right Femoral Bruit] : no bruit heard over the right femoral artery [Left Carotid Bruit] : no bruit heard over the left carotid

## 2020-01-06 ENCOUNTER — RX RENEWAL (OUTPATIENT)
Age: 85
End: 2020-01-06

## 2020-01-14 ENCOUNTER — APPOINTMENT (OUTPATIENT)
Dept: PULMONOLOGY | Facility: CLINIC | Age: 85
End: 2020-01-14
Payer: MEDICARE

## 2020-01-14 VITALS
RESPIRATION RATE: 16 BRPM | OXYGEN SATURATION: 97 % | BODY MASS INDEX: 37.9 KG/M2 | WEIGHT: 188 LBS | SYSTOLIC BLOOD PRESSURE: 124 MMHG | DIASTOLIC BLOOD PRESSURE: 78 MMHG | HEART RATE: 59 BPM | HEIGHT: 59 IN

## 2020-01-14 PROCEDURE — 99204 OFFICE O/P NEW MOD 45 MIN: CPT | Mod: 25

## 2020-01-14 PROCEDURE — 94060 EVALUATION OF WHEEZING: CPT

## 2020-01-14 PROCEDURE — 94729 DIFFUSING CAPACITY: CPT

## 2020-01-14 PROCEDURE — ZZZZZ: CPT

## 2020-01-14 NOTE — PHYSICAL EXAM
[General Appearance - Well Developed] : well developed [General Appearance - Well Nourished] : well nourished [General Appearance - In No Acute Distress] : no acute distress [Normal Conjunctiva] : the conjunctiva exhibited no abnormalities [Jugular Venous Distention Increased] : there was no jugular-venous distention [] : the neck was supple [Respiration, Rhythm And Depth] : normal respiratory rhythm and effort [Heart Sounds] : normal S1 and S2 [Auscultation Breath Sounds / Voice Sounds] : lungs were clear to auscultation bilaterally [Abdomen Tenderness] : non-tender [Abdomen Soft] : soft [Abnormal Walk] : normal gait [Cyanosis, Localized] : no localized cyanosis [Non-Pitting] : non-pitting [Nail Clubbing] : no clubbing of the fingernails [Skin Color & Pigmentation] : normal skin color and pigmentation [Oriented To Time, Place, And Person] : oriented to person, place, and time [Cranial Nerves] : cranial nerves 2-12 were intact [No Focal Deficits] : no focal deficits [Erythema] : no erythema of the pharynx

## 2020-01-14 NOTE — PROCEDURE
[FreeTextEntry1] : PFT 1/14/2020 personally reviewed normal spirometry and gas exchange, mild bronchodilator response.

## 2020-01-14 NOTE — ASSESSMENT
[FreeTextEntry1] : 84 year old female Hx rheumatoid arthritis, recently diagnosed atrial fibrillation on Xarelto, amiodarone treatment presents to [facility] for evaluation baseline pulmonary status, normal spirometry and gas exchange\par \par Continue amiodarone per Cardiology, no pulmonary contraindication to amiodarone treatment\par \par Follow up 6 months Spirometry and DLCO

## 2020-01-14 NOTE — HISTORY OF PRESENT ILLNESS
[FreeTextEntry1] : Patient is an 84 year old female Hx rheumatoid arthritis, recently diagnosed atrial fibrillation, on Xarelto, amiodarone therapy, presents to [facility] for evaluation of baseline pulmonary status for continuation amiodarone therapy.  Patient reports no respiratory complaints.  She was referred by her Cardiologist for pulmonary function testing.

## 2020-02-18 ENCOUNTER — APPOINTMENT (OUTPATIENT)
Dept: CARDIOLOGY | Facility: CLINIC | Age: 85
End: 2020-02-18

## 2020-02-18 NOTE — PHYSICAL EXAM
[General Appearance - Well Developed] : well developed [Well Groomed] : well groomed [General Appearance - Well Nourished] : well nourished [Normal Appearance] : normal appearance [Normal Conjunctiva] : the conjunctiva exhibited no abnormalities [General Appearance - In No Acute Distress] : no acute distress [No Deformities] : no deformities [Normal Jugular Venous V Waves Present] : normal jugular venous V waves present [Normal Jugular Venous A Waves Present] : normal jugular venous A waves present [No Jugular Venous Castro A Waves] : no jugular venous castro A waves [Respiration, Rhythm And Depth] : normal respiratory rhythm and effort [Bowel Sounds] : normal bowel sounds [Auscultation Breath Sounds / Voice Sounds] : lungs were clear to auscultation bilaterally [Exaggerated Use Of Accessory Muscles For Inspiration] : no accessory muscle use [Abdomen Soft] : soft [Abdomen Tenderness] : non-tender [Nail Clubbing] : no clubbing of the fingernails [Cyanosis, Localized] : no localized cyanosis [Skin Turgor] : normal skin turgor [Skin Color & Pigmentation] : normal skin color and pigmentation [Oriented To Time, Place, And Person] : oriented to person, place, and time [] : no rash [Impaired Insight] : insight and judgment were intact [No Anxiety] : not feeling anxious [Normal S2] : normal S2 [Normal Rate] : normal [Normal S1] : normal S1 [No Murmur] : no murmurs heard [2+] : left 2+ [No Abnormalities] : the abdominal aorta was not enlarged and no bruit was heard [1+] : right 1+ [No Pitting Edema] : no pitting edema present [FreeTextEntry1] : Walks with cane [S3] : no S3 [S4] : no S4 [Left Carotid Bruit] : no bruit heard over the left carotid [Right Carotid Bruit] : no bruit heard over the right carotid [Right Femoral Bruit] : no bruit heard over the right femoral artery [Left Femoral Bruit] : no bruit heard over the left femoral artery

## 2020-02-18 NOTE — HISTORY OF PRESENT ILLNESS
[FreeTextEntry1] : I saw Marciano Silver in the office today for cardiac followup. She is an 84-year-old white female was a history of hypertension, hyperlipidemia, and elevated A1c. She also has a history of rheumatoid arthritis which has been disabling. She had bilateral knee replacement and has been on low-dose prednisone. She denies any history of smoking but does have 2 sons, one with a heart attack at age 56 and one at 60. She has no known history of heart disease.\par \par She was admitted to [facility] in August with confusion found to have a TIA/CVA. CT scan showed bilateral infarcts of the basal ganglia. She was put on high-dose atorvastatin for cholesterol and Toprol 25 mg once a day for hypertension. She was also taking one low-dose aspirin a day.\par \par She saw an ophthalmologist who felt that she might have bilateral emboli in her retina.\par \par Review of records from [place]. Echo demonstrated ejection fraction of 55-60% with LVH. Carotid Doppler showed 50-69% right internal carotid artery and less than 50% of left internal carotid artery. 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 Monitor showed she was going in and out of atrial fibrillation and she was seen by electrophysiology. The gallbladder was drained and she was placed on beta blocker and anticoagulation. \par \par The patient underwent cholecystectomy. This was complicated by atrial fibrillation with rapid ventricular rate. She was placed on amiodarone with conversion to sinus rhythm. She is now taking amiodarone 200 mg once a day, and is still on metoprolol 25 mg once a day. She was taking oxycodone postoperatively for pain and she's now been constipated and using MiraLax. She still is on low dose aspirin for her carotid disease as well as prednisone 5 mg once a day for her arthritis\par \par She is walking without any difficulty. She has no chest pain, shortness of breath, or palpitation. Unfortunately the patient is starting to gain back the weight that she has lost from her surgery.\par \par

## 2020-02-18 NOTE — REVIEW OF SYSTEMS
[Joint Pain] : joint pain [Negative] : Genitourinary [Shortness Of Breath] : no shortness of breath [Chest Pain] : no chest pain [Palpitations] : no palpitations [Abdominal Pain] : no abdominal pain [Heartburn] : no heartburn [Dysphagia] : no dysphagia [Skin: A Rash] : no rash: [Dizziness] : no dizziness [Anxiety] : no anxiety [Excessive Thirst] : no polydipsia [Depression] : no depression [Easy Bruising] : no tendency for easy bruising [Easy Bleeding] : no tendency for easy bleeding

## 2020-03-04 ENCOUNTER — RX RENEWAL (OUTPATIENT)
Age: 85
End: 2020-03-04

## 2020-03-17 ENCOUNTER — APPOINTMENT (OUTPATIENT)
Dept: CARDIOLOGY | Facility: CLINIC | Age: 85
End: 2020-03-17

## 2020-03-23 ENCOUNTER — RX RENEWAL (OUTPATIENT)
Age: 85
End: 2020-03-23

## 2020-05-28 ENCOUNTER — APPOINTMENT (OUTPATIENT)
Dept: CARDIOLOGY | Facility: CLINIC | Age: 85
End: 2020-05-28
Payer: MEDICARE

## 2020-05-28 VITALS — BODY MASS INDEX: 39.39 KG/M2 | DIASTOLIC BLOOD PRESSURE: 80 MMHG | SYSTOLIC BLOOD PRESSURE: 150 MMHG | WEIGHT: 195 LBS

## 2020-05-28 PROCEDURE — 99214 OFFICE O/P EST MOD 30 MIN: CPT | Mod: 95

## 2020-05-28 NOTE — PHYSICAL EXAM
[General Appearance - Well Developed] : well developed [Normal Appearance] : normal appearance [Well Groomed] : well groomed [General Appearance - Well Nourished] : well nourished [No Deformities] : no deformities [General Appearance - In No Acute Distress] : no acute distress [Oriented To Time, Place, And Person] : oriented to person, place, and time [Impaired Insight] : insight and judgment were intact [No Anxiety] : not feeling anxious

## 2020-05-28 NOTE — REVIEW OF SYSTEMS
[Joint Pain] : joint pain [Negative] : Genitourinary [Shortness Of Breath] : no shortness of breath [Palpitations] : no palpitations [Chest Pain] : no chest pain [Abdominal Pain] : no abdominal pain [Dysphagia] : no dysphagia [Heartburn] : no heartburn [Skin: A Rash] : no rash: [Dizziness] : no dizziness [Depression] : no depression [Anxiety] : no anxiety [Excessive Thirst] : no polydipsia [Easy Bleeding] : no tendency for easy bleeding [Easy Bruising] : no tendency for easy bruising

## 2020-05-28 NOTE — DISCUSSION/SUMMARY
[FreeTextEntry1] : Volume overload secondary to venous insufficiency.  She'll start using support stockings, leg elevation, and low-salt diet. They will call me in 2 weeks to let me know how she going. With her renal insufficiency I would like to avoid diuretic.\par I will get a copy of the blood work from Dr. João Horton. She otherwise will stay on her medication.\par \par If she has any problems they will call me. I like to see her in the office in approximately 2 months.\par \par We discussed the above in detail including her clinical state, and medication, and I answered all of their questions.

## 2020-05-28 NOTE — HISTORY OF PRESENT ILLNESS
[Home] : at home, [unfilled] , at the time of the visit. [Medical Office: ([facility])___] : at the medical office located in  [Verbal consent obtained from patient] : the patient, [unfilled] [Family Member] : family member [FreeTextEntry1] : I evaluated Marciano Silver in the office today by telehealth. Appropriate consents were obtained.. She is an 84-year-old white female was a history of hypertension, hyperlipidemia, and elevated A1c. She also has a history of rheumatoid arthritis which has been disabling. She had bilateral knee replacement and has been on low-dose prednisone. She denies any history of smoking but does have 2 sons, one with a heart attack at age 56 and one at 60. She has no known history of heart disease.\par \par She was admitted to [facility] in August 2019 with confusion found to have a TIA/CVA. CT scan showed bilateral infarcts of the basal ganglia. She was put on high-dose atorvastatin for cholesterol and Toprol 25 mg once a day for hypertension. She was also taking one low-dose aspirin a day.\par \par She saw an ophthalmologist who felt that she might have bilateral emboli in her retina.\par \par Review of records from [place]. Echo demonstrated ejection fraction of 55-60% with LVH. Carotid Doppler showed 50-69% right internal carotid artery and less than 50% of left internal carotid artery. 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 Monitor showed she was going in and out of atrial fibrillation and she was seen by electrophysiology. The gallbladder was drained and she was placed on beta blocker and anticoagulation. \par \par The patient underwent cholecystectomy. This was complicated by atrial fibrillation with rapid ventricular rate. She was placed on amiodarone with conversion to sinus rhythm. She is now taking amiodarone 200 mg once a day, and is still on metoprolol 25 mg once a day. She was taking oxycodone postoperatively for pain and she's now been constipated and using MiraLax. She still is on low dose aspirin for her carotid disease as well as prednisone 5 mg once a day for her arthritis\par \par She is walking without any difficulty. She has no chest pain, shortness of breath, or palpitation. Unfortunately the patient is starting to gain back the weight that she has lost from her surgery.\par \par He has gained about 10 pounds of water weight has bilateral leg edema. She saw a vascular surgeon. Doppler was negative for DVT. She was diagnosed with venous insufficiency. Wearing support stockings. Recent blood work showed significant hypothyroidism and her dose of amiodarone is being reduced to 100 mg once a day. She also has some renal insufficiency. She had a chest x-ray for chest pain and has a fractured rib. She is now on codeine. She also has a vitamin D level of 15 and is taking vitamin D 50,000 units once a week.\par \par Her weight is up 195 pounds. Blood pressure at home has been approximately 150/80. She had PFTs 1/20 which was stable without any evidence of amiodarone toxicity. She denies any chest pain or shortness of breath.\par \par  [FreeTextEntry4] : Elizabeth YOUSSEF

## 2020-06-11 ENCOUNTER — APPOINTMENT (OUTPATIENT)
Dept: PULMONOLOGY | Facility: CLINIC | Age: 85
End: 2020-06-11
Payer: MEDICARE

## 2020-06-11 PROCEDURE — 99214 OFFICE O/P EST MOD 30 MIN: CPT | Mod: 95

## 2020-06-11 NOTE — PHYSICAL EXAM
[No Acute Distress] : no acute distress [Gait - Sufficient For Exercise Testing] : gait sufficient for exercise testing [No Resp Distress] : no resp distress [Normal Appearance] : normal appearance [No Clubbing] : no clubbing [1+ Pitting] : 1+ pitting [Oriented x3] : oriented x3

## 2020-06-11 NOTE — ASSESSMENT
[FreeTextEntry1] : 84 year old female Hx RA, CVA, atrial fibrillation on amiodarone therapy recent hospitalization for ?volume overload presents post hospitalization visit.\par \par Obtain records from [facility]\par Baseline PFT and 6 minute walk\par Continue Amiodarone per Cardiology\par \par Follow up 2-4 weeks

## 2020-06-11 NOTE — REVIEW OF SYSTEMS
[Fatigue] : fatigue [Poor Appetite] : poor appetite [Palpitations] : palpitations [Thyroid Problem] : thyroid problem [Negative] : Psychiatric [TextBox_122] : See HPI

## 2020-06-11 NOTE — HISTORY OF PRESENT ILLNESS
[Medical Office: ([facility])___] : at the medical office located in  [Home] : at home, [unfilled] , at the time of the visit. [Family Member] : family member [Verbal consent obtained from patient] : the patient, [unfilled] [TextBox_4] : Patient is an 84 year old female Hx RA, atrial fibrillation on Amiodarone treatment, CVA in past, recent rib fracture, presents to [facility] s/p recent hospitalization at [facility].  Patient is at home and daughter is present.  Patient daughter reports patient lower extremities had swollen and she was unable to walk and in significant amount of pain (no records available for review).  Patient was in the hospital for two days and discharged.  She is feeling better however weak.  She denies shortness of breath at rest, cough, fever, chills or other complaints.

## 2020-06-12 DIAGNOSIS — Z01.812 ENCOUNTER FOR PREPROCEDURAL LABORATORY EXAMINATION: ICD-10-CM

## 2020-06-30 LAB — SARS-COV-2 N GENE NPH QL NAA+PROBE: NOT DETECTED

## 2020-07-02 ENCOUNTER — APPOINTMENT (OUTPATIENT)
Dept: PULMONOLOGY | Facility: CLINIC | Age: 85
End: 2020-07-02
Payer: MEDICARE

## 2020-07-02 PROCEDURE — 94727 GAS DIL/WSHOT DETER LNG VOL: CPT

## 2020-07-02 PROCEDURE — 94729 DIFFUSING CAPACITY: CPT

## 2020-07-02 PROCEDURE — 94010 BREATHING CAPACITY TEST: CPT

## 2020-07-09 ENCOUNTER — APPOINTMENT (OUTPATIENT)
Dept: PULMONOLOGY | Facility: CLINIC | Age: 85
End: 2020-07-09
Payer: MEDICARE

## 2020-07-09 PROCEDURE — 94618 PULMONARY STRESS TESTING: CPT

## 2020-10-20 ENCOUNTER — RX RENEWAL (OUTPATIENT)
Age: 85
End: 2020-10-20

## 2020-12-10 ENCOUNTER — NON-APPOINTMENT (OUTPATIENT)
Age: 85
End: 2020-12-10

## 2020-12-10 ENCOUNTER — APPOINTMENT (OUTPATIENT)
Dept: CARDIOLOGY | Facility: CLINIC | Age: 85
End: 2020-12-10
Payer: MEDICARE

## 2020-12-10 VITALS
DIASTOLIC BLOOD PRESSURE: 80 MMHG | OXYGEN SATURATION: 99 % | SYSTOLIC BLOOD PRESSURE: 140 MMHG | HEART RATE: 67 BPM | HEIGHT: 59 IN | BODY MASS INDEX: 39.92 KG/M2 | WEIGHT: 198 LBS

## 2020-12-10 DIAGNOSIS — R73.09 OTHER ABNORMAL GLUCOSE: ICD-10-CM

## 2020-12-10 PROCEDURE — 93000 ELECTROCARDIOGRAM COMPLETE: CPT

## 2020-12-10 PROCEDURE — 99215 OFFICE O/P EST HI 40 MIN: CPT

## 2020-12-10 RX ORDER — LEVOTHYROXINE SODIUM 0.03 MG/1
25 TABLET ORAL
Refills: 0 | Status: ACTIVE | COMMUNITY

## 2020-12-10 NOTE — DISCUSSION/SUMMARY
[FreeTextEntry1] : I discussed with the daughter and the patient about her back coagulation and amiodarone. If she continues to fall she cannot be on anticoagulation and we may consider the watchman procedure. Will give her 3 weeks off of the Xarelto to let the retroperitoneal bleed heal. In the meantime she is to be extra careful not to fall. She cannot do any activity without supervision. When she went home from [place] she fell again.\par \par She will get repeat pulmonary function study. If the diffusion capacity continues to fall we may have to stop the amiodarone.\par \par This was all discussed with the patient and her daughter and I answered their questions. We went over the importance of anticoagulation and the importance of being safe and not falling.

## 2020-12-10 NOTE — HISTORY OF PRESENT ILLNESS
[FreeTextEntry1] : I saw Marciano Silver in the office today for cardiac followup. She is an 85-year-old white female was a history of hypertension, hyperlipidemia, and elevated A1c. She also has a history of rheumatoid arthritis which has been disabling. She had bilateral knee replacement and has been on low-dose prednisone. She denies any history of smoking but does have 2 sons, one with a heart attack at age 56 and one at 60. She has no known history of heart disease.\par \par She was admitted to [facility] in August with confusion found to have a TIA/CVA. CT scan showed bilateral infarcts of the basal ganglia. She was put on high-dose atorvastatin for cholesterol and Toprol 25 mg once a day for hypertension. She was also taking one low-dose aspirin a day.\par \par She saw an ophthalmologist who felt that she might have bilateral emboli in her retina.\par \par Review of records from [facility]. Echo demonstrated ejection fraction of 55-60% with LVH. Carotid Doppler showed 50-69% right internal carotid artery and less than 50% of left internal carotid artery. 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 Monitor showed she was going in and out of atrial fibrillation and she was seen by electrophysiology. The gallbladder was drained and she was placed on beta blocker and anticoagulation. \par \par The patient underwent cholecystectomy. This was complicated by atrial fibrillation with rapid ventricular rate. She was placed on amiodarone with conversion to sinus rhythm. She is now taking amiodarone 200 mg once a day, and is still on metoprolol 25 mg once a day. She was taking oxycodone postoperatively for pain and she's now been constipated and using MiraLax. She still is on low dose aspirin for her carotid disease as well as prednisone 5 mg once a day for her arthritis\par \par I last evaluated her by telemetry health 5/28/20 for edema.We tried support stockings. She was admitted to [facility] 6/24 increasing bilateral leg edema. She had been doing better though more recently she fell in her kitchen and developed severe hematoma in her back. She was admitted to [facility] with severe back and leg pain. Found to have severe hematoma and bleeding in the retroperitoneal area and her Xarelto was stopped.. We had reduced her amiodarone 200 mg a day because of the drop in her diffusion capacity from 85-65%. At [facility] they increased the amiodarone back to 200 mg a day and start her on low-dose aspirin.\par \par ECG demonstrates sinus rhythm and is normal.\par \par

## 2020-12-10 NOTE — REASON FOR VISIT
[Follow-Up - Clinic] : a clinic follow-up of [Atrial Fibrillation] : atrial fibrillation [Hyperlipidemia] : hyperlipidemia [Hypertension] : hypertension [FreeTextEntry1] : CVA, retroperitoneal bleed

## 2020-12-10 NOTE — PHYSICAL EXAM
[General Appearance - Well Developed] : well developed [Normal Appearance] : normal appearance [Well Groomed] : well groomed [General Appearance - Well Nourished] : well nourished [No Deformities] : no deformities [General Appearance - In No Acute Distress] : no acute distress [Normal Conjunctiva] : the conjunctiva exhibited no abnormalities [Normal Jugular Venous A Waves Present] : normal jugular venous A waves present [Normal Jugular Venous V Waves Present] : normal jugular venous V waves present [No Jugular Venous Castro A Waves] : no jugular venous castro A waves [Respiration, Rhythm And Depth] : normal respiratory rhythm and effort [Exaggerated Use Of Accessory Muscles For Inspiration] : no accessory muscle use [Auscultation Breath Sounds / Voice Sounds] : lungs were clear to auscultation bilaterally [Bowel Sounds] : normal bowel sounds [Abdomen Soft] : soft [Abdomen Tenderness] : non-tender [Nail Clubbing] : no clubbing of the fingernails [Cyanosis, Localized] : no localized cyanosis [Skin Color & Pigmentation] : normal skin color and pigmentation [Skin Turgor] : normal skin turgor [] : no rash [Oriented To Time, Place, And Person] : oriented to person, place, and time [Impaired Insight] : insight and judgment were intact [No Anxiety] : not feeling anxious [Normal Rate] : normal [Normal S1] : normal S1 [Normal S2] : normal S2 [No Murmur] : no murmurs heard [2+] : left 2+ [1+] : left 1+ [No Abnormalities] : the abdominal aorta was not enlarged and no bruit was heard [No Pitting Edema] : no pitting edema present [FreeTextEntry1] : Walks with cane [S3] : no S3 [S4] : no S4 [Right Carotid Bruit] : no bruit heard over the right carotid [Left Carotid Bruit] : no bruit heard over the left carotid [Right Femoral Bruit] : no bruit heard over the right femoral artery [Left Femoral Bruit] : no bruit heard over the left femoral artery

## 2020-12-15 ENCOUNTER — RX RENEWAL (OUTPATIENT)
Age: 85
End: 2020-12-15

## 2020-12-15 ENCOUNTER — NON-APPOINTMENT (OUTPATIENT)
Age: 85
End: 2020-12-15

## 2020-12-20 ENCOUNTER — APPOINTMENT (OUTPATIENT)
Dept: DISASTER EMERGENCY | Facility: CLINIC | Age: 85
End: 2020-12-20

## 2020-12-22 LAB — SARS-COV-2 N GENE NPH QL NAA+PROBE: NOT DETECTED

## 2020-12-23 ENCOUNTER — APPOINTMENT (OUTPATIENT)
Dept: PULMONOLOGY | Facility: CLINIC | Age: 85
End: 2020-12-23
Payer: MEDICARE

## 2020-12-23 ENCOUNTER — NON-APPOINTMENT (OUTPATIENT)
Age: 85
End: 2020-12-23

## 2020-12-23 VITALS
HEART RATE: 59 BPM | OXYGEN SATURATION: 97 % | DIASTOLIC BLOOD PRESSURE: 70 MMHG | RESPIRATION RATE: 16 BRPM | SYSTOLIC BLOOD PRESSURE: 124 MMHG | TEMPERATURE: 97.2 F | HEIGHT: 59 IN | BODY MASS INDEX: 39.51 KG/M2 | WEIGHT: 196 LBS

## 2020-12-23 PROCEDURE — 99214 OFFICE O/P EST MOD 30 MIN: CPT

## 2020-12-23 PROCEDURE — ZZZZZ: CPT

## 2020-12-23 NOTE — PHYSICAL EXAM
[No Acute Distress] : no acute distress [Normal Appearance] : normal appearance [No Resp Distress] : no resp distress [Gait - Sufficient For Exercise Testing] : gait sufficient for exercise testing [No Clubbing] : no clubbing [1+ Pitting] : 1+ pitting [Oriented x3] : oriented x3

## 2020-12-23 NOTE — ASSESSMENT
[FreeTextEntry1] : 84 year old female Hx RA, CVA, atrial fibrillation on amiodarone therapy recent hospitalization for bleeding secondary to anticoagulation, Normal Spirometry\par \par Continue Amiodarone per Cardiology\par \par Follow up 6 months with Spirometry DLCO

## 2020-12-23 NOTE — HISTORY OF PRESENT ILLNESS
[Never] : never [Family Member] : family member [TextBox_4] : Patient is an 84 year old female Hx RA, atrial fibrillation on Amiodarone treatment, CVA in past, recent rib fracture, presents to [facility] s/p recent hospitalization for bleeding.  Patient stopped anticoagulation secondary to bleeding.  She is remains on Amiodarone treatment.  She denies increased respiratory symptoms.

## 2020-12-24 ENCOUNTER — NON-APPOINTMENT (OUTPATIENT)
Age: 85
End: 2020-12-24

## 2020-12-24 ENCOUNTER — APPOINTMENT (OUTPATIENT)
Dept: OPHTHALMOLOGY | Facility: CLINIC | Age: 85
End: 2020-12-24
Payer: MEDICARE

## 2020-12-24 PROCEDURE — 92004 COMPRE OPH EXAM NEW PT 1/>: CPT

## 2021-01-04 ENCOUNTER — APPOINTMENT (OUTPATIENT)
Dept: CARDIOLOGY | Facility: CLINIC | Age: 86
End: 2021-01-04
Payer: MEDICARE

## 2021-01-04 VITALS
SYSTOLIC BLOOD PRESSURE: 160 MMHG | WEIGHT: 202 LBS | OXYGEN SATURATION: 97 % | HEART RATE: 58 BPM | DIASTOLIC BLOOD PRESSURE: 86 MMHG | BODY MASS INDEX: 40.72 KG/M2 | HEIGHT: 59 IN

## 2021-01-04 DIAGNOSIS — G62.9 POLYNEUROPATHY, UNSPECIFIED: ICD-10-CM

## 2021-01-04 PROCEDURE — 99214 OFFICE O/P EST MOD 30 MIN: CPT

## 2021-01-04 NOTE — DISCUSSION/SUMMARY
[FreeTextEntry1] : The patient clinically is maintaining sinus rhythm and is recovering from her retroperitoneal hematoma. I spoke with Dr. Garsia's office and they will schedule a diffusion capacity. In the meantime she'll reduce her amiodarone back to 100 mg a day and continue on the Xarelto.  Concerned that there could be further drop in her diffusion capacity which could lead to a reversible lung toxicity.\par \par She'll continue on the gabapentin which seems to be treating her peripheral neuropathy. She'll walk with assistance. Assuming she has no further problems I would see her in 2 months.\par \par We did go over her medications with her daughter. Still awaiting records from [facility] which I will review them and speak with the patient and her family.

## 2021-01-04 NOTE — HISTORY OF PRESENT ILLNESS
[FreeTextEntry1] : I saw Marciano Silver in the office today for cardiac followup. She is an 85-year-old white female was a history of hypertension, hyperlipidemia, and elevated A1c. She also has a history of rheumatoid arthritis which has been disabling. She had bilateral knee replacement and has been on low-dose prednisone. She denies any history of smoking but does have 2 sons, one with a heart attack at age 56 and one at 60. She has no known history of heart disease.\par \par She was admitted to [facility] in August with confusion found to have a TIA/CVA. CT scan showed bilateral infarcts of the basal ganglia. She was put on high-dose atorvastatin for cholesterol and Toprol 25 mg once a day for hypertension. She was also taking one low-dose aspirin a day.\par \par She saw an ophthalmologist who felt that she might have bilateral emboli in her retina.\par \par Review of records from [place]. Echo demonstrated ejection fraction of 55-60% with LVH. Carotid Doppler showed 50-69% right internal carotid artery and less than 50% of left internal carotid artery. 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 Monitor showed she was going in and out of atrial fibrillation and she was seen by electrophysiology. The gallbladder was drained and she was placed on beta blocker and anticoagulation. \par \par The patient underwent cholecystectomy. This was complicated by atrial fibrillation with rapid ventricular rate. She was placed on amiodarone with conversion to sinus rhythm. She is now taking amiodarone 200 mg once a day, and is still on metoprolol 25 mg once a day. She was taking oxycodone postoperatively for pain and she's now been constipated and using MiraLax. She still is on low dose aspirin for her carotid disease as well as prednisone 5 mg once a day for her arthritis\par \par I last evaluated her by telemetry health 5/28/20 for edema.We tried support stockings. She was admitted to [place] 6/24 increasing bilateral leg edema. She had been doing better though more recently she fell in her kitchen and developed severe hematoma in her back. She was admitted to [facility] with severe back and leg pain. Found to have severe hematoma and bleeding in the retroperitoneal area and her Xarelto was stopped.. We had reduced her amiodarone 200 mg a day because of the drop in her diffusion capacity from 85-65%. At [place] they increased the amiodarone back to 200 mg a day, stopped Xarelto and started her on low-dose aspirin.\par \par She saw Dr. Garsia the pulmonologist who performed pulmonary function studies that were normal. She felt that the amiodarone could be continued. Only spirometry was performed. The patient still needs a diffusion capacity.  She has been feeling better and has not had any further  falling events.\par \par

## 2021-01-05 DIAGNOSIS — Z01.812 ENCOUNTER FOR PREPROCEDURAL LABORATORY EXAMINATION: ICD-10-CM

## 2021-01-09 DIAGNOSIS — Z01.818 ENCOUNTER FOR OTHER PREPROCEDURAL EXAMINATION: ICD-10-CM

## 2021-01-10 ENCOUNTER — APPOINTMENT (OUTPATIENT)
Dept: DISASTER EMERGENCY | Facility: CLINIC | Age: 86
End: 2021-01-10

## 2021-01-12 LAB — SARS-COV-2 N GENE NPH QL NAA+PROBE: NOT DETECTED

## 2021-01-13 ENCOUNTER — APPOINTMENT (OUTPATIENT)
Age: 86
End: 2021-01-13
Payer: MEDICARE

## 2021-01-13 PROCEDURE — 94010 BREATHING CAPACITY TEST: CPT

## 2021-01-13 PROCEDURE — 94727 GAS DIL/WSHOT DETER LNG VOL: CPT

## 2021-03-08 ENCOUNTER — APPOINTMENT (OUTPATIENT)
Dept: CARDIOLOGY | Facility: CLINIC | Age: 86
End: 2021-03-08
Payer: MEDICARE

## 2021-03-08 VITALS
WEIGHT: 194 LBS | OXYGEN SATURATION: 96 % | HEIGHT: 59 IN | HEART RATE: 64 BPM | DIASTOLIC BLOOD PRESSURE: 80 MMHG | BODY MASS INDEX: 39.11 KG/M2 | SYSTOLIC BLOOD PRESSURE: 153 MMHG

## 2021-03-08 PROCEDURE — 99214 OFFICE O/P EST MOD 30 MIN: CPT

## 2021-03-08 NOTE — CARDIOLOGY SUMMARY
[Normal] : normal [No Ischemia] : no Ischemia [___] : [unfilled] [LVEF ___%] : LVEF [unfilled]% [Date of PFT___] : Date of last PFT [unfilled]

## 2021-03-10 ENCOUNTER — RX RENEWAL (OUTPATIENT)
Age: 86
End: 2021-03-10

## 2021-04-07 ENCOUNTER — RX RENEWAL (OUTPATIENT)
Age: 86
End: 2021-04-07

## 2021-06-28 ENCOUNTER — RX RENEWAL (OUTPATIENT)
Age: 86
End: 2021-06-28

## 2021-07-13 ENCOUNTER — APPOINTMENT (OUTPATIENT)
Age: 86
End: 2021-07-13
Payer: MEDICARE

## 2021-07-13 VITALS
WEIGHT: 185 LBS | BODY MASS INDEX: 31.58 KG/M2 | TEMPERATURE: 97 F | HEIGHT: 64 IN | RESPIRATION RATE: 16 BRPM | DIASTOLIC BLOOD PRESSURE: 68 MMHG | SYSTOLIC BLOOD PRESSURE: 122 MMHG | HEART RATE: 58 BPM | OXYGEN SATURATION: 98 %

## 2021-07-13 PROCEDURE — 99213 OFFICE O/P EST LOW 20 MIN: CPT | Mod: 25

## 2021-07-13 PROCEDURE — 94729 DIFFUSING CAPACITY: CPT | Mod: 52

## 2021-07-13 PROCEDURE — ZZZZZ: CPT

## 2021-07-13 PROCEDURE — 94010 BREATHING CAPACITY TEST: CPT | Mod: 52

## 2021-07-13 NOTE — HISTORY OF PRESENT ILLNESS
[Never] : never [Family Member] : family member [TextBox_4] : Patient is an 85 year old female Hx RA, atrial fibrillation on Amiodarone treatment, CVA in past, recent rib fracture, presents to [facility] for follow up.  Patient remains on Amiodarone treatment.  She is taking chronic prednisone for RA. She needs a shoulder replacement however unable.  She is in constant pain.  She attempted spirometry DLCO but unable.  She is here for follow up.

## 2021-07-13 NOTE — PROCEDURE
[FreeTextEntry1] : Spirometry 12/23/20 personally reviewed normal Spirometry\par \par PFT 7/13/21 personally reviewed \par \par DLCO 7/13/21 patient unable secondary pain

## 2021-07-13 NOTE — REASON FOR VISIT
[Follow-Up] : a follow-up visit [Family Member] : family member [TextBox_44] : Amiodarone therapy/RA

## 2021-07-13 NOTE — ASSESSMENT
[FreeTextEntry1] : 84 year old female Hx RA, CVA, atrial fibrillation on amiodarone therapy, chronic steroids for RA presents for follow up\par \par Will return to attempt spirometry/DLCO when pain improves. If unable consider imaging study.

## 2021-08-25 ENCOUNTER — APPOINTMENT (OUTPATIENT)
Dept: GASTROENTEROLOGY | Facility: CLINIC | Age: 86
End: 2021-08-25
Payer: MEDICARE

## 2021-08-25 ENCOUNTER — APPOINTMENT (OUTPATIENT)
Age: 86
End: 2021-08-25
Payer: MEDICARE

## 2021-08-25 VITALS
HEART RATE: 87 BPM | HEIGHT: 64 IN | SYSTOLIC BLOOD PRESSURE: 112 MMHG | WEIGHT: 198 LBS | DIASTOLIC BLOOD PRESSURE: 75 MMHG | BODY MASS INDEX: 33.8 KG/M2 | OXYGEN SATURATION: 95 % | TEMPERATURE: 98 F

## 2021-08-25 DIAGNOSIS — Z79.899 OTHER LONG TERM (CURRENT) DRUG THERAPY: ICD-10-CM

## 2021-08-25 DIAGNOSIS — K59.09 OTHER CONSTIPATION: ICD-10-CM

## 2021-08-25 DIAGNOSIS — R94.2 ABNORMAL RESULTS OF PULMONARY FUNCTION STUDIES: ICD-10-CM

## 2021-08-25 DIAGNOSIS — J45.909 UNSPECIFIED ASTHMA, UNCOMPLICATED: ICD-10-CM

## 2021-08-25 DIAGNOSIS — Z51.81 ENCOUNTER FOR THERAPEUTIC DRUG LVL MONITORING: ICD-10-CM

## 2021-08-25 DIAGNOSIS — Z79.899 ENCOUNTER FOR THERAPEUTIC DRUG LVL MONITORING: ICD-10-CM

## 2021-08-25 DIAGNOSIS — Z86.73 PERSONAL HISTORY OF TRANSIENT ISCHEMIC ATTACK (TIA), AND CEREBRAL INFARCTION W/OUT RESIDUAL DEFICITS: ICD-10-CM

## 2021-08-25 PROCEDURE — 99204 OFFICE O/P NEW MOD 45 MIN: CPT

## 2021-08-25 PROCEDURE — 99213 OFFICE O/P EST LOW 20 MIN: CPT

## 2021-08-25 RX ORDER — WHEAT DEXTRIN 3 G/4 G
POWDER (GRAM) ORAL
Qty: 1 | Refills: 0 | Status: ACTIVE | OUTPATIENT
Start: 2021-08-25

## 2021-08-25 RX ORDER — DOCUSATE SODIUM 100 MG/1
100 CAPSULE ORAL
Qty: 60 | Refills: 3 | Status: ACTIVE | OUTPATIENT
Start: 2021-08-25

## 2021-08-25 NOTE — HISTORY OF PRESENT ILLNESS
[FreeTextEntry1] : Patient is an 86-year-old female who complains of occasional constipation.  She denies seeing any blood in the stool.  She has no abdominal pain or bloating.  She has never had a colonoscopy.\par Patient also has no upper gastrointestinal symptoms such as heartburn, dysphagia, or early satiety.\par \par H/H was 14.4/44.9, MCV 87, ferritin 75.

## 2021-08-25 NOTE — ASSESSMENT
[FreeTextEntry1] : Patient with symptoms of constipation.  She will be given Bene fiber 1 tablespoon with breakfast and dinner and Colace 1 to 2 tablets a day.  If the constipation continues, she can take MiraLAX on a as needed basis.\par FOBT will be sent to the lab.\par There is no evidence of iron deficiency anemia.

## 2021-08-25 NOTE — PHYSICAL EXAM
[General Appearance - Alert] : alert [General Appearance - In No Acute Distress] : in no acute distress [Sclera] : the sclera and conjunctiva were normal [PERRL With Normal Accommodation] : pupils were equal in size, round, and reactive to light [Extraocular Movements] : extraocular movements were intact [Outer Ear] : the ears and nose were normal in appearance [Oropharynx] : the oropharynx was normal [Neck Appearance] : the appearance of the neck was normal [Neck Cervical Mass (___cm)] : no neck mass was observed [Jugular Venous Distention Increased] : there was no jugular-venous distention [Thyroid Diffuse Enlargement] : the thyroid was not enlarged [Thyroid Nodule] : there were no palpable thyroid nodules [Auscultation Breath Sounds / Voice Sounds] : lungs were clear to auscultation bilaterally [Heart Rate And Rhythm] : heart rate was normal and rhythm regular [Heart Sounds] : normal S1 and S2 [Murmurs] : no murmurs [Heart Sounds Gallop] : no gallops [Bowel Sounds] : normal bowel sounds [Heart Sounds Pericardial Friction Rub] : no pericardial rub [Abdomen Soft] : soft [Abdomen Tenderness] : non-tender [] : no hepato-splenomegaly [Abdomen Mass (___ Cm)] : no abdominal mass palpated [No CVA Tenderness] : no ~M costovertebral angle tenderness [No Spinal Tenderness] : no spinal tenderness [Abnormal Walk] : normal gait [Nail Clubbing] : no clubbing  or cyanosis of the fingernails [Musculoskeletal - Swelling] : no joint swelling seen [Motor Tone] : muscle strength and tone were normal [Oriented To Time, Place, And Person] : oriented to person, place, and time [Impaired Insight] : insight and judgment were intact [Affect] : the affect was normal

## 2021-10-02 ENCOUNTER — TRANSCRIPTION ENCOUNTER (OUTPATIENT)
Age: 86
End: 2021-10-02

## 2021-10-06 ENCOUNTER — APPOINTMENT (OUTPATIENT)
Age: 86
End: 2021-10-06
Payer: MEDICARE

## 2021-10-06 PROCEDURE — ZZZZZ: CPT

## 2021-10-19 PROBLEM — Z51.81 ENCOUNTER FOR MONITORING AMIODARONE THERAPY: Status: ACTIVE | Noted: 2020-01-14

## 2021-10-19 PROBLEM — R94.2 ABNORMAL DIFFUSION CAPACITY DETERMINED BY PULMONARY FUNCTION TEST: Status: ACTIVE | Noted: 2021-10-19

## 2021-10-19 NOTE — ASSESSMENT
[FreeTextEntry1] : 86 year old female Hx RA, CVA, atrial fibrillation on amiodarone therapy, chronic steroids for RA presents for follow up\par \par PFT \par CXR PA and lateral\par Consider CT chest evaluate for amiodarone related lung disease\par \par Follow up 4-6 weeks

## 2021-10-19 NOTE — PROCEDURE
[FreeTextEntry1] : Spirometry 12/23/20 personally reviewed normal Spirometry\par \par PFT 7/13/21 personally reviewed \par \par DLCO 7/13/21 patient unable secondary pain\par \par PFT 10/7/21 personally reviewed moderate gas exchange abnormality

## 2021-10-19 NOTE — HISTORY OF PRESENT ILLNESS
[Never] : never [Family Member] : family member [TextBox_4] : Patient is an 86 year old female Hx RA, atrial fibrillation on Amiodarone treatment, CVA in past, recent rib fracture, presents to [facility] for follow up.  Patient remains on Amiodarone treatment.  She is taking chronic prednisone for RA. She had cortisone injection and shoulder pain is better controlled.  She needs PFT baseline for Amiodarone therapy.  She is here for follow up.

## 2021-11-09 ENCOUNTER — RX RENEWAL (OUTPATIENT)
Age: 86
End: 2021-11-09

## 2021-11-29 ENCOUNTER — APPOINTMENT (OUTPATIENT)
Dept: GASTROENTEROLOGY | Facility: CLINIC | Age: 86
End: 2021-11-29

## 2021-12-15 ENCOUNTER — RX RENEWAL (OUTPATIENT)
Age: 86
End: 2021-12-15

## 2021-12-23 ENCOUNTER — RX RENEWAL (OUTPATIENT)
Age: 86
End: 2021-12-23

## 2022-01-06 ENCOUNTER — APPOINTMENT (OUTPATIENT)
Dept: CARDIOLOGY | Facility: CLINIC | Age: 87
End: 2022-01-06
Payer: MEDICARE

## 2022-01-06 ENCOUNTER — NON-APPOINTMENT (OUTPATIENT)
Age: 87
End: 2022-01-06

## 2022-01-06 VITALS
HEART RATE: 59 BPM | WEIGHT: 198 LBS | OXYGEN SATURATION: 97 % | DIASTOLIC BLOOD PRESSURE: 86 MMHG | HEIGHT: 64 IN | BODY MASS INDEX: 33.8 KG/M2 | SYSTOLIC BLOOD PRESSURE: 146 MMHG

## 2022-01-06 DIAGNOSIS — I48.0 PAROXYSMAL ATRIAL FIBRILLATION: ICD-10-CM

## 2022-01-06 DIAGNOSIS — I10 ESSENTIAL (PRIMARY) HYPERTENSION: ICD-10-CM

## 2022-01-06 DIAGNOSIS — I65.29 OCCLUSION AND STENOSIS OF UNSPECIFIED CAROTID ARTERY: ICD-10-CM

## 2022-01-06 DIAGNOSIS — E78.5 HYPERLIPIDEMIA, UNSPECIFIED: ICD-10-CM

## 2022-01-06 PROCEDURE — 99214 OFFICE O/P EST MOD 30 MIN: CPT

## 2022-01-06 PROCEDURE — 93000 ELECTROCARDIOGRAM COMPLETE: CPT

## 2022-01-06 NOTE — DISCUSSION/SUMMARY
[FreeTextEntry1] : Patient's cardiac status is stable.  She is maintaining sinus rhythm on low-dose amiodarone.  7 no symptoms of chest pain, shortness of breath, palpitation.  PFTs are stable.\par \par Developing increasing leg edema which may in part be related to the amlodipine.  I am going to stop it and the family will monitor her blood pressure at home and call me.  We discussed treatment of the peripheral edema including low-salt diet, leg elevation, and either support stockings or trying to wrap the legs with wide Ace bandage.  Unfortunately patient sits the majority of the day with her legs down.\par \par She will come back for an echocardiogram to check on the heart structurally and repeat carotid Doppler to check on her cerebrovascular disease.  I would appreciate any recent blood work.  If all is well I will see the patient again in about 6 months.

## 2022-01-06 NOTE — HISTORY OF PRESENT ILLNESS
[FreeTextEntry1] : I saw Marciano Silver in the office today for cardiac followup. She is an 86-year-old white female has a history of hypertension, hyperlipidemia, and elevated A1c. She also has a history of rheumatoid arthritis which has been disabling. She had bilateral knee replacement and has been on low-dose prednisone. She denies any history of smoking but does have 2 sons, one with a heart attack at age 56 and one at 60. She has no known history of heart disease.\par \par She was admitted to [facility] in August 2019 with confusion found to have a TIA/CVA. CT scan showed bilateral infarcts of the basal ganglia. She was put on high-dose atorvastatin for cholesterol and Toprol 25 mg once a day for hypertension. She was also taking one low-dose aspirin a day.\par \par She saw an ophthalmologist who felt that she might have bilateral emboli in her retina.\par \par Review of records from [place] 8/19. Echo demonstrated ejection fraction of 55-60% with LVH. Carotid Doppler showed 50-69% right internal carotid artery and less than 50% of left internal carotid artery. 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 Monitor showed she was going in and out of atrial fibrillation and she was seen by electrophysiology. The gallbladder was drained and she was placed on beta blocker and anticoagulation. \par \par The patient underwent cholecystectomy. This was complicated by atrial fibrillation with rapid ventricular rate. She was placed on amiodarone with conversion to sinus rhythm. She is now taking amiodarone 200 mg once a day, and is still on metoprolol 25 mg once a day. She was taking oxycodone postoperatively for pain and she's now been constipated and using MiraLax. She still is on low dose aspirin for her carotid disease as well as prednisone 5 mg once a day for her arthritis\par \par I  evaluated her by telemetry health 5/28/20 for edema.We tried support stockings. She was admitted to [place] 6/24 increasing bilateral leg edema. She had been doing better but fell in her kitchen and developed severe hematoma in her back. She was admitted to [facility] 12/20 with severe back and leg pain. Found to have severe hematoma and bleeding in the retroperitoneal area and her Xarelto was stopped.. We had reduced her amiodarone 100 mg a day because of the drop in her diffusion capacity from 85-65%. At [place] they increased the amiodarone back to 200 mg a day, stopped Xarelto and started her on low-dose aspirin.\par \par She saw Dr. Garsia the pulmonologist who performed pulmonary function studies that were normal. She felt that the amiodarone could be continued. Diffusion capacity was 70%. Had been 56% 7/20.  Most recent PFT 10/21 was 59%.  She is now on amiodarone 100 mg once a day.  She has been feeling better and has not had any further  falling events.She seems to be maintaining sinus rhythm. She is back on Xarelto without bleeding.\par \par Blood work 2/21 demonstrated a cholesterol of 136, HDL 71 direct LDL 46.5.  Globin 14.4 with hematocrit 44.9.  GFR 28.6.  Normal TSH 0.66 with minimally elevated T4 1.65.  \par \par Patient developing increasing peripheral edema with varicose veins.  HShe does not want to use support stockings.\par

## 2022-01-06 NOTE — HISTORY OF PRESENT ILLNESS
[FreeTextEntry1] : I saw Marciano Silver in the office today for cardiac followup. She is an 85-year-old white female has a history of hypertension, hyperlipidemia, and elevated A1c. She also has a history of rheumatoid arthritis which has been disabling. She had bilateral knee replacement and has been on low-dose prednisone. She denies any history of smoking but does have 2 sons, one with a heart attack at age 56 and one at 60. She has no known history of heart disease.\par \par She was admitted to [facility] in August 2019 with confusion found to have a TIA/CVA. CT scan showed bilateral infarcts of the basal ganglia. She was put on high-dose atorvastatin for cholesterol and Toprol 25 mg once a day for hypertension. She was also taking one low-dose aspirin a day.\par \par She saw an ophthalmologist who felt that she might have bilateral emboli in her retina.\par \par Review of records from [place] 8/19. Echo demonstrated ejection fraction of 55-60% with LVH. Carotid Doppler showed 50-69% right internal carotid artery and less than 50% of left internal carotid artery. 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 Monitor showed she was going in and out of atrial fibrillation and she was seen by electrophysiology. The gallbladder was drained and she was placed on beta blocker and anticoagulation. \par \par The patient underwent cholecystectomy. This was complicated by atrial fibrillation with rapid ventricular rate. She was placed on amiodarone with conversion to sinus rhythm. She is now taking amiodarone 200 mg once a day, and is still on metoprolol 25 mg once a day. She was taking oxycodone postoperatively for pain and she's now been constipated and using MiraLax. She still is on low dose aspirin for her carotid disease as well as prednisone 5 mg once a day for her arthritis\par \par I  evaluated her by telemetry health 5/28/20 for edema.We tried support stockings. She was admitted to [place] 6/24 increasing bilateral leg edema. She had been doing better but fell in her kitchen and developed severe hematoma in her back. She was admitted to [facility] 12/20 with severe back and leg pain. Found to have severe hematoma and bleeding in the retroperitoneal area and her Xarelto was stopped.. We had reduced her amiodarone 100 mg a day because of the drop in her diffusion capacity from 85-65%. At [place] they increased the amiodarone back to 200 mg a day, stopped Xarelto and started her on low-dose aspirin.\par \par She saw Dr. Garsia the pulmonologist who performed pulmonary function studies that were normal. She felt that the amiodarone could be continued. Diffusion capacity was 70%. Had been 56% 7/20.  She has been feeling better and has not had any further  falling events.He seems to be maintaining sinus rhythm. She is back on Xarelto without bleeding.\par \par \par

## 2022-01-06 NOTE — CARDIOLOGY SUMMARY
[Normal] : normal [No Ischemia] : no Ischemia [___] : [unfilled] [LVEF ___%] : LVEF [unfilled]% [___] : [unfilled] [Date of PFT___] : Date of last PFT [unfilled]

## 2022-01-06 NOTE — DISCUSSION/SUMMARY
[FreeTextEntry1] : She will stop her low-dose aspirin now that she is back on Xarelto. If she discontinues the fall he went to consider the watchman procedure and then stop the anticoagulation. Blood pressure today is mildly elevated. She'll stop monitor blood pressure at home and call me week with the results. We met to adjust her blood pressure medicine. I need the results of her most recent bloodwork dose the Xarelto.\par \par This was discussed with the patient and her daughter and I answered all their questions

## 2022-02-01 ENCOUNTER — RX RENEWAL (OUTPATIENT)
Age: 87
End: 2022-02-01

## 2022-02-01 RX ORDER — GABAPENTIN 100 MG/1
100 CAPSULE ORAL TWICE DAILY
Qty: 90 | Refills: 3 | Status: ACTIVE | COMMUNITY
Start: 2021-04-07

## 2022-05-17 ENCOUNTER — RX RENEWAL (OUTPATIENT)
Age: 87
End: 2022-05-17

## 2022-05-17 RX ORDER — ATORVASTATIN CALCIUM 80 MG/1
80 TABLET, FILM COATED ORAL DAILY
Qty: 90 | Refills: 3 | Status: ACTIVE | COMMUNITY
Start: 2019-08-19 | End: 1900-01-01

## 2022-07-13 ENCOUNTER — APPOINTMENT (OUTPATIENT)
Dept: GASTROENTEROLOGY | Facility: CLINIC | Age: 87
End: 2022-07-13

## 2022-07-13 VITALS
TEMPERATURE: 97.7 F | BODY MASS INDEX: 32.1 KG/M2 | DIASTOLIC BLOOD PRESSURE: 78 MMHG | WEIGHT: 188 LBS | OXYGEN SATURATION: 97 % | HEIGHT: 64 IN | SYSTOLIC BLOOD PRESSURE: 135 MMHG | HEART RATE: 62 BPM

## 2022-07-13 DIAGNOSIS — M06.9 RHEUMATOID ARTHRITIS, UNSPECIFIED: ICD-10-CM

## 2022-07-13 DIAGNOSIS — R14.0 ABDOMINAL DISTENSION (GASEOUS): ICD-10-CM

## 2022-07-13 DIAGNOSIS — Z12.11 ENCOUNTER FOR SCREENING FOR MALIGNANT NEOPLASM OF COLON: ICD-10-CM

## 2022-07-13 DIAGNOSIS — R19.7 DIARRHEA, UNSPECIFIED: ICD-10-CM

## 2022-07-13 PROCEDURE — 99214 OFFICE O/P EST MOD 30 MIN: CPT

## 2022-07-13 RX ORDER — LACTASE 9000 UNIT
9000 TABLET ORAL
Qty: 90 | Refills: 5 | Status: ACTIVE | OUTPATIENT
Start: 2022-07-13

## 2022-07-13 RX ORDER — PREDNISONE 5 MG/1
5 TABLET ORAL
Qty: 60 | Refills: 0 | Status: ACTIVE | COMMUNITY
Start: 2022-02-01

## 2022-07-13 RX ORDER — WHEAT DEXTRIN 3 G/4 G
POWDER (GRAM) ORAL
Qty: 1 | Refills: 0 | Status: ACTIVE | OUTPATIENT
Start: 2022-07-13

## 2022-07-13 RX ORDER — TRAMADOL HYDROCHLORIDE 50 MG/1
50 TABLET, COATED ORAL
Qty: 90 | Refills: 0 | Status: ACTIVE | COMMUNITY
Start: 2022-07-10

## 2022-07-13 RX ORDER — PREDNISONE 20 MG/1
20 TABLET ORAL
Qty: 7 | Refills: 0 | Status: ACTIVE | COMMUNITY
Start: 2022-06-01

## 2022-07-13 RX ORDER — KETOCONAZOLE 20 MG/G
2 CREAM TOPICAL
Qty: 60 | Refills: 0 | Status: ACTIVE | COMMUNITY
Start: 2022-02-01

## 2022-07-13 RX ORDER — LEVOTHYROXINE SODIUM 0.1 MG/1
100 TABLET ORAL
Qty: 90 | Refills: 0 | Status: ACTIVE | COMMUNITY
Start: 2022-06-08

## 2022-07-21 NOTE — HISTORY OF PRESENT ILLNESS
[FreeTextEntry1] : Patient is an 86-year-old female who complains of predominantly diarrhea.  She has about 3-4 bowel movements in the morning.  She claims to be awakened and has to move her bowels at night.  She has 2-3 bowel movements at night.  The symptoms seem to have started several months ago.  She denies seeing any blood or mucus in the stool..    She has no abdominal pain but some  bloating.  She has never had a colonoscopy.\par Patient also has no upper gastrointestinal symptoms such as heartburn, dysphagia, or early satiety.\par \par Patient does complain of some pain in the left flank.  This seems to be more positional.\par \par Blood work revealed normal H/H.  Iron/TIBC 39/340, 14% iron saturation, ferritin 35, creatinine 2.0.

## 2022-07-21 NOTE — ASSESSMENT
[FreeTextEntry1] : Patient with irregular bowel movements predominantly diarrhea which may have been nocturnal.  She has had the symptoms over the past several months.  Because of her age, FOBT and Cologuard screening test will be done initially.  She will be given Bene fiber and Lactaid tablets.  If her symptoms persist, further work-up will be considered.

## 2022-07-25 LAB — HEMOCCULT STL QL IA: NEGATIVE

## 2022-10-12 ENCOUNTER — APPOINTMENT (OUTPATIENT)
Dept: GASTROENTEROLOGY | Facility: CLINIC | Age: 87
End: 2022-10-12

## 2022-12-14 ENCOUNTER — RX RENEWAL (OUTPATIENT)
Age: 87
End: 2022-12-14

## 2022-12-14 ENCOUNTER — APPOINTMENT (OUTPATIENT)
Dept: VASCULAR SURGERY | Facility: CLINIC | Age: 87
End: 2022-12-14

## 2022-12-14 VITALS
HEART RATE: 58 BPM | BODY MASS INDEX: 32.27 KG/M2 | WEIGHT: 189 LBS | TEMPERATURE: 98 F | SYSTOLIC BLOOD PRESSURE: 134 MMHG | DIASTOLIC BLOOD PRESSURE: 77 MMHG | HEIGHT: 64 IN

## 2022-12-14 PROCEDURE — 99203 OFFICE O/P NEW LOW 30 MIN: CPT

## 2022-12-14 PROCEDURE — 93970 EXTREMITY STUDY: CPT

## 2022-12-14 NOTE — HISTORY OF PRESENT ILLNESS
[FreeTextEntry1] : Ms. RICARDO CEDEÑO is a 87 year F who presents for initial of bilateral leg pain for the past several months. \par Ms. CEDEÑO leg discomfort is associated with leg swelling, fatigue, heaviness, achiness, restlessness, muscle cramping, itchiness, dry, flaky skin, and skin darkening at the ankles. \par She complains of painful varicose veins.\par Her symptoms have persisted despite conservative management with leg elevation, exercise, attempted weight loss, over-the-counter medications (ibuprofen), and compression stocking use for more than 3 months. \par Her symptoms are aggravated by  weight bearing, prolonged standing, prolonged sitting, extended travel, exercise Nothing helps to alleviate patient's symptoms. \par Her symptoms interfere with the her ADLs such as shopping and housekeeping. \par Ms. CEDEÑO risks factor for venous disease are obesity, pregnancy, family history of venous disease, history of varicose veins\par Previous treatment, vein procedures and vein surgeries include: wearing compression stockings for more than 3 months with little or no relief \par \par \par

## 2022-12-14 NOTE — PHYSICAL EXAM
[FreeTextEntry1] : LE vein findings: \par Varicosities (spider)  bilateral \par Edema bilateral\par Skin changes  dry, flaky skin, stasis dermatitis, hyperpigmentation in the gator area\par Ulcer none\par CEAP classification C3\par Palpable DP pulses bilaterally\par \par

## 2022-12-14 NOTE — ASSESSMENT
[FreeTextEntry1] : Ms. RICARDO CEDEÑO is a 87 year with persistent and worsening bilateral lower extremity venous insufficiency, CEAP classification C3 which is unresponsive to at least 3 months of compression stockings 20-30 mmHg, leg elevation, exercise and over the counter pain medication_(Ibuprofen). Patient has complaints of worsening leg discomfort with swelling, fatigue, heaviness, achiness, cramping, restlessness, and painful varicosities. The patient’s symptoms interfere with their ADL’s, such as walking, shopping and house work, and their ability to exercise. Patient has intact pulses in both legs without evidence of arterial insufficiency. \par Treatment is indicated not for cosmetic reasons but for symptomatic venous reflux disease with symptoms which is refractory to conservative therapy. Venous duplex study demonstrates left lower extremity venous insufficiency. The need for definitive effective treatment is based on severe, interfering and worsening reflux symptoms with evidence of venous insufficiency on venous ultrasound. \par Patient is a candidate for endovenous ablation treatment of the left SSV. \par The risks and benefits of endovenous ablation treatment versus continued conservative management were discussed with the patient. Patient chooses endovenous ablation treatments. Treatment plan to be scheduled.\par

## 2022-12-15 RX ORDER — ALPRAZOLAM 0.25 MG/1
0.25 TABLET ORAL
Qty: 1 | Refills: 0 | Status: ACTIVE | COMMUNITY
Start: 2022-12-15 | End: 1900-01-01

## 2022-12-15 RX ORDER — SODIUM BICARBONATE 84 MG/ML
8.4 INJECTION, SOLUTION INTRAVENOUS
Qty: 5 | Refills: 0 | Status: ACTIVE | COMMUNITY
Start: 2022-12-15 | End: 1900-01-01

## 2022-12-15 RX ORDER — LIDOCAINE HYDROCHLORIDE 10 MG/ML
1 INJECTION, SOLUTION INFILTRATION; PERINEURAL
Qty: 50 | Refills: 0 | Status: ACTIVE | COMMUNITY
Start: 2022-12-15 | End: 1900-01-01

## 2023-01-09 RX ORDER — SODIUM BICARBONATE 84 MG/ML
8.4 INJECTION, SOLUTION INTRAVENOUS
Qty: 5 | Refills: 0 | Status: ACTIVE | COMMUNITY
Start: 2023-01-09 | End: 1900-01-01

## 2023-01-09 RX ORDER — LIDOCAINE HYDROCHLORIDE 10 MG/ML
1 INJECTION, SOLUTION INFILTRATION; PERINEURAL
Qty: 50 | Refills: 0 | Status: ACTIVE | COMMUNITY
Start: 2023-01-09 | End: 1900-01-01

## 2023-01-09 RX ORDER — ALPRAZOLAM 0.25 MG/1
0.25 TABLET ORAL
Qty: 1 | Refills: 0 | Status: ACTIVE | COMMUNITY
Start: 2023-01-09 | End: 1900-01-01

## 2023-01-11 ENCOUNTER — NON-APPOINTMENT (OUTPATIENT)
Age: 88
End: 2023-01-11

## 2023-01-11 ENCOUNTER — APPOINTMENT (OUTPATIENT)
Dept: CARDIOLOGY | Facility: CLINIC | Age: 88
End: 2023-01-11
Payer: MEDICARE

## 2023-01-11 VITALS — SYSTOLIC BLOOD PRESSURE: 140 MMHG | DIASTOLIC BLOOD PRESSURE: 76 MMHG

## 2023-01-11 VITALS
HEART RATE: 65 BPM | OXYGEN SATURATION: 96 % | WEIGHT: 189 LBS | SYSTOLIC BLOOD PRESSURE: 174 MMHG | HEIGHT: 64 IN | DIASTOLIC BLOOD PRESSURE: 89 MMHG | BODY MASS INDEX: 32.27 KG/M2

## 2023-01-11 DIAGNOSIS — I63.9 CEREBRAL INFARCTION, UNSPECIFIED: ICD-10-CM

## 2023-01-11 PROCEDURE — 93000 ELECTROCARDIOGRAM COMPLETE: CPT

## 2023-01-11 PROCEDURE — 99214 OFFICE O/P EST MOD 30 MIN: CPT

## 2023-01-12 ENCOUNTER — APPOINTMENT (OUTPATIENT)
Dept: VASCULAR SURGERY | Facility: CLINIC | Age: 88
End: 2023-01-12
Payer: MEDICARE

## 2023-01-12 PROCEDURE — 36475 ENDOVENOUS RF 1ST VEIN: CPT | Mod: LT

## 2023-01-12 NOTE — PROCEDURE
[FreeTextEntry1] : left SSV RFA [FreeTextEntry3] : Procedural safety checklist and time out completed:\par Confirmed patient identification (Patient Name, , and/or medical record number including when possible affirmation by patient or parent/family/other).\par Confirmed procedure with the patient. Consent present, accurate and signed. \par Confirmed special equipment and supplies are present.\par Sterility confirmed. Position verified. \par Site/ side is marked and visible and confirmed. \par Procedure confirmed by consent. Accurate consent including side and site.\par Review of medical records, including venous ultrasound, noting correct procedure including site and side.\par MD/PA verifies presence and review of imaging studies and or written report of imaging studies.\par Agreement on the procedure to be performed\par Time out completed.\par All of the above has been confirmed by the team.\par All patient-specific concerns have been addressed. \par \par Indication: left lower extremity varicose veins with inflammation, leg pain, leg swelling, and leg cramping.  Venous insufficiency/ reflux.\par \par Procedure: radiofrequency ablation of the left small saphenous vein. \par 	\par Ms. RICARDO CEDEÑO is a 87 year old F with a history of left lower extremity varicose veins previously seen in the office.  Ultrasound examination demonstrated venous insufficiency. A trial of compression stockings, exercise, elevation, and pain medication was attempted without relief and definitive treatment with radiofrequency ablation was offered. \par \par The patient has come for radiofrequency ablation treatment of the left small saphenous vein.\par I have discussed the risks of the procedure at length with the patient. The risks discussed were inclusive of but not limited to infection, irritation at the site of infiltration of local anesthesia, and also rare risk of deep venous thrombosis and pulmonary emboli. The patient agrees to proceed with the procedure. \par The patient was escorted into the procedure room and a time out called.\par The entire limb was prepped and draped in sterile fashion. The RF fiber was placed on the sterile field and connected by a sterile cable. Actuation, temperature, and impedance testing were performed to ensure that all components were connected and operating properly. \par The patient was placed on the procedure table and local anesthesia was instilled in the skin overlying the access site. Under ultrasound guidance, the vein was punctured with a micropuncture needle, using the anterior wall technique. A guide wire was now introduced through the needle, and the needle was then exchanged over the guide wire for a 7F sheath. The guide wire was removed and the RF probe was then placed into the left small saphenous vein through the sheath and position confirmed using ultrasound guidance. After the RF probe position was verified by ultrasound, tumescent anesthesia consisting of normal saline, 1% lidocaine with 8.4% sodium bicarbonate was infiltrated, under ultrasound guidance, precisely into the perivenous compartment along the entire length of the vein until a “halo” of fluid was noted around the vein. After RF probe position was again confirmed with ultrasound imaging, RF energy was applied. The probe was gradually and carefully withdrawn at a rate of 6.5cm/20seconds. \par \par 3 cycles of RF performed using the 7 cm probe\par Total treatment time was 60 seconds.\par The total volume injected was 250  cc\par Treatment length was 13.5 cm and \par The probe is 3.5 cm from the SPJ.\par \par Estimated Blood Loss: minimal\par Repeat ultrasound of the treated vein was performed confirming successful treatment. The catheter and sheath were withdrawn and hemostasis established with direct pressure. After assuring hemostasis, a sterile 4x4 was placed on the access site and an ACE compression wrap was applied. Patient tolerated procedure well. Patient was given post-procedure instructions and follow up appointment was scheduled.\par \par \par

## 2023-01-13 NOTE — DISCUSSION/SUMMARY
[FreeTextEntry1] : 88 y/o female known to the practice but is now transferring care due to her cardiologist retiring. Her PMhx is as noted on HPI. Today she is doing well overall with no acute cardiac distress. She continues to be in NSR with current dose of Amiodarone 100 mg daily and Metoprolol 25 mg daily. She will continue to take Xarelto for AFib RVR and hx of CVA/TIA. She had no signs of bleeding or ecchymosis during exam. \par \par Her leg Edema has improved since stopping amlodipine as per patient and son. Her Bp was in the 130s-140/70s. I will not make any changed to her medications today. She is to continue on Metoprolol 25.\par \par She was again highly advised and educated on the importance of repeating Echo and Stress test. She continues to refuse testing. \par She will follow up in 6 month or sooner if any changes.

## 2023-01-13 NOTE — HISTORY OF PRESENT ILLNESS
[FreeTextEntry1] : I saw Marciano Silver in the office today for Cardiac Care and transfer of care from Dr Olivera who has retired. \par \par She is an 87-year-old female past history of AFib w/ RVR (on AC & Amio 100 mg), hypertension, hyperlipidemia, TIA/CVA (on AC & high dose Statin), Rheumatoid Arthritis, hx Falls. She denies any history of smoking but does have 2 sons, one with a heart attack at age 56 and one at 60. She has no known history of heart disease.\par \par Review of records from [place] 8/2019. Echo demonstrated ejection fraction of 55-60% with LVH. Carotid Doppler showed 50-69% right internal carotid artery and less than 50% of left internal carotid artery.\par \par She reports since stopping Amlodipine her leg swelling has improved. She continues to wear compression stocking. She also continues to take all cardiac medications as prescribed. She denies any bleeding episodes. She reports taking her BP at home which trend between 120s-150/70s - 80s \par Today she denies any chest pain. JOSEPH. SOB, dizziness, light headiness or orthopnea. \par \par \par

## 2023-01-18 ENCOUNTER — APPOINTMENT (OUTPATIENT)
Dept: VASCULAR SURGERY | Facility: CLINIC | Age: 88
End: 2023-01-18
Payer: MEDICARE

## 2023-01-18 PROCEDURE — 93971 EXTREMITY STUDY: CPT | Mod: LT

## 2023-02-15 ENCOUNTER — APPOINTMENT (OUTPATIENT)
Dept: VASCULAR SURGERY | Facility: CLINIC | Age: 88
End: 2023-02-15
Payer: MEDICARE

## 2023-02-15 VITALS — SYSTOLIC BLOOD PRESSURE: 136 MMHG | DIASTOLIC BLOOD PRESSURE: 80 MMHG | HEART RATE: 64 BPM

## 2023-02-15 VITALS — HEIGHT: 64 IN | BODY MASS INDEX: 32.27 KG/M2 | WEIGHT: 189 LBS | TEMPERATURE: 97.6 F

## 2023-02-15 DIAGNOSIS — I87.2 VENOUS INSUFFICIENCY (CHRONIC) (PERIPHERAL): ICD-10-CM

## 2023-02-15 PROCEDURE — 99213 OFFICE O/P EST LOW 20 MIN: CPT

## 2023-02-15 NOTE — HISTORY OF PRESENT ILLNESS
[FreeTextEntry1] : Ms. RICARDO CEDEÑO is a 87 year F who presents for initial of bilateral leg pain for the past several months. \par Ms. CEDEÑO leg discomfort is associated with leg swelling, fatigue, heaviness, achiness, restlessness, muscle cramping, itchiness, dry, flaky skin, and skin darkening at the ankles. \par She complains of painful varicose veins.\par Her symptoms have persisted despite conservative management with leg elevation, exercise, attempted weight loss, over-the-counter medications (ibuprofen), and compression stocking use for more than 3 months. \par Her symptoms are aggravated by  weight bearing, prolonged standing, prolonged sitting, extended travel, exercise Nothing helps to alleviate patient's symptoms. \par Her symptoms interfere with the her ADLs such as shopping and housekeeping. \par Ms. CEDEÑO risks factor for venous disease are obesity, pregnancy, family history of venous disease, history of varicose veins\par Previous treatment, vein procedures and vein surgeries include: wearing compression stockings for more than 3 months with little or no relief \par \par \par  [de-identified] : s/p L SSV RFA with resolution of left ankle swelling. she states her left leg feels much better.

## 2023-02-15 NOTE — ASSESSMENT
[FreeTextEntry1] : Problem #1 venous insufficiency of bilateral lower extremities\par - s/p L SSV RFA with resolution of LLE edema\par - stable RLE edema\par - follow up in 3 months for interval evaluation

## 2023-02-15 NOTE — PHYSICAL EXAM
[Respiratory Effort] : normal respiratory effort [Normal Rate and Rhythm] : normal rate and rhythm [2+] : left 2+ [Ankle Swelling (On Exam)] : present [Ankle Swelling On The Right] : of the right ankle [Varicose Veins Of Lower Extremities] : bilaterally [] : bilaterally [Ankle Swelling On The Left] : moderate [Abdomen Tenderness] : ~T ~M No abdominal tenderness [Skin Ulcer] : no ulcer [Alert] : alert [Oriented to Person] : oriented to person [Oriented to Place] : oriented to place [Oriented to Time] : oriented to time [Calm] : calm [de-identified] : appears stated age [de-identified] : normocephalic, atraumatic [de-identified] : supple

## 2023-03-16 ENCOUNTER — RX RENEWAL (OUTPATIENT)
Age: 88
End: 2023-03-16

## 2023-03-16 RX ORDER — PANTOPRAZOLE 40 MG/1
40 TABLET, DELAYED RELEASE ORAL DAILY
Qty: 90 | Refills: 3 | Status: ACTIVE | COMMUNITY
Start: 2019-08-19

## 2023-03-16 RX ORDER — RIVAROXABAN 15 MG/1
15 TABLET, FILM COATED ORAL
Qty: 90 | Refills: 3 | Status: ACTIVE | COMMUNITY
Start: 2021-12-23

## 2023-05-16 ENCOUNTER — APPOINTMENT (OUTPATIENT)
Dept: VASCULAR SURGERY | Facility: CLINIC | Age: 88
End: 2023-05-16

## 2023-05-26 ENCOUNTER — NON-APPOINTMENT (OUTPATIENT)
Age: 88
End: 2023-05-26

## 2023-07-12 ENCOUNTER — APPOINTMENT (OUTPATIENT)
Dept: CARDIOLOGY | Facility: CLINIC | Age: 88
End: 2023-07-12

## 2024-02-23 ENCOUNTER — APPOINTMENT (OUTPATIENT)
Dept: CARDIOLOGY | Facility: CLINIC | Age: 89
End: 2024-02-23

## 2024-03-04 ENCOUNTER — RX RENEWAL (OUTPATIENT)
Age: 89
End: 2024-03-04

## 2024-03-04 RX ORDER — AMIODARONE HYDROCHLORIDE 200 MG/1
200 TABLET ORAL
Qty: 90 | Refills: 3 | Status: ACTIVE | COMMUNITY
Start: 2021-03-10 | End: 1900-01-01

## 2024-09-04 ENCOUNTER — APPOINTMENT (OUTPATIENT)
Dept: OTOLARYNGOLOGY | Facility: CLINIC | Age: 89
End: 2024-09-04

## 2024-10-01 ENCOUNTER — APPOINTMENT (OUTPATIENT)
Dept: OTOLARYNGOLOGY | Facility: CLINIC | Age: 89
End: 2024-10-01
Payer: MEDICARE

## 2024-10-01 VITALS — BODY MASS INDEX: 30.73 KG/M2 | WEIGHT: 180 LBS | HEIGHT: 64 IN

## 2024-10-01 DIAGNOSIS — H61.23 IMPACTED CERUMEN, BILATERAL: ICD-10-CM

## 2024-10-01 DIAGNOSIS — H93.292 OTHER ABNORMAL AUDITORY PERCEPTIONS, LEFT EAR: ICD-10-CM

## 2024-10-01 PROCEDURE — 69210 REMOVE IMPACTED EAR WAX UNI: CPT

## 2024-10-01 PROCEDURE — 92557 COMPREHENSIVE HEARING TEST: CPT

## 2024-10-01 PROCEDURE — 99204 OFFICE O/P NEW MOD 45 MIN: CPT | Mod: 25

## 2024-10-01 PROCEDURE — 92567 TYMPANOMETRY: CPT

## 2024-10-01 RX ORDER — FLUTICASONE PROPIONATE 50 UG/1
50 SPRAY, METERED NASAL DAILY
Qty: 1 | Refills: 2 | Status: ACTIVE | COMMUNITY
Start: 2024-10-01 | End: 1900-01-01

## 2024-10-01 NOTE — DATA REVIEWED
[No studies available for review at this time] : No studies available for review at this time [de-identified] : 10/01/24: mod severe to severe CHL, no sig Carhardt's notch, type B tymp

## 2024-10-01 NOTE — ASSESSMENT
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  Suspect fluid, but present for years- consider also otosclerosis (no conclusive carhardt notch and there is a type B tymp so more likely fluid) - flonase x 6 weeks, repeat audio - left diagnostic myringotomy +/- tube if there is fluid depending on response to flonase.  if no fluid, referral to otology

## 2024-10-01 NOTE — HISTORY OF PRESENT ILLNESS
[de-identified] : 88 yo female with left ear hearing issues for many years. She denies prior hearing evaluations or other workup for this suspected hearing loss of the left ear. She has not worn hearing aids.  No pain in the ears, no ringing in the ears or dizziness,.  She does have mild nasal congestion on occasion

## 2024-10-01 NOTE — REASON FOR VISIT
[Initial Evaluation] : an initial evaluation for [FreeTextEntry2] : 88 yo female with left sided hearing loss

## 2024-10-01 NOTE — END OF VISIT
[FreeTextEntry3] : I personally saw and examined RICARDO CEDEÑO in detail.  I spoke to PING Lobo regarding the assessment and plan of care. I performed the procedures and relevant physical exam.   I have made changes to the body of the note wherever necessary and appropriate.

## 2024-10-01 NOTE — PHYSICAL EXAM
[Normal] : tympanic membranes are normal in both ears [de-identified] : cerumen in the ear canals; once removed normal EACs

## 2024-10-01 NOTE — PROCEDURE
[FreeTextEntry1] : cerumen removal  [FreeTextEntry2] : cerumen impaction  [FreeTextEntry3] : after informed verbal consent, cerumen is removed from the bilateral EACs with curette

## 2024-11-12 ENCOUNTER — APPOINTMENT (OUTPATIENT)
Dept: OTOLARYNGOLOGY | Facility: CLINIC | Age: 89
End: 2024-11-12
Payer: MEDICARE

## 2024-11-12 VITALS
WEIGHT: 180 LBS | HEIGHT: 64 IN | HEART RATE: 59 BPM | SYSTOLIC BLOOD PRESSURE: 112 MMHG | DIASTOLIC BLOOD PRESSURE: 63 MMHG | BODY MASS INDEX: 30.73 KG/M2

## 2024-11-12 DIAGNOSIS — H93.292 OTHER ABNORMAL AUDITORY PERCEPTIONS, LEFT EAR: ICD-10-CM

## 2024-11-12 PROCEDURE — 92567 TYMPANOMETRY: CPT

## 2024-11-12 PROCEDURE — 99213 OFFICE O/P EST LOW 20 MIN: CPT

## 2024-11-12 PROCEDURE — 92557 COMPREHENSIVE HEARING TEST: CPT

## 2024-12-18 ENCOUNTER — APPOINTMENT (OUTPATIENT)
Dept: OTOLARYNGOLOGY | Facility: CLINIC | Age: 89
End: 2024-12-18
Payer: MEDICARE

## 2024-12-18 DIAGNOSIS — H90.3 SENSORINEURAL HEARING LOSS, BILATERAL: ICD-10-CM

## 2024-12-18 DIAGNOSIS — H61.22 IMPACTED CERUMEN, LEFT EAR: ICD-10-CM

## 2024-12-18 PROCEDURE — 92557 COMPREHENSIVE HEARING TEST: CPT

## 2024-12-18 PROCEDURE — 99213 OFFICE O/P EST LOW 20 MIN: CPT

## 2024-12-18 PROCEDURE — 92567 TYMPANOMETRY: CPT

## 2025-02-12 ENCOUNTER — APPOINTMENT (OUTPATIENT)
Dept: VASCULAR SURGERY | Facility: CLINIC | Age: 89
End: 2025-02-12

## 2025-02-19 ENCOUNTER — APPOINTMENT (OUTPATIENT)
Dept: VASCULAR SURGERY | Facility: CLINIC | Age: 89
End: 2025-02-19
Payer: MEDICARE

## 2025-02-19 VITALS
DIASTOLIC BLOOD PRESSURE: 76 MMHG | TEMPERATURE: 97.3 F | HEART RATE: 61 BPM | WEIGHT: 180 LBS | HEIGHT: 64 IN | BODY MASS INDEX: 30.73 KG/M2 | SYSTOLIC BLOOD PRESSURE: 127 MMHG

## 2025-02-19 DIAGNOSIS — G62.9 POLYNEUROPATHY, UNSPECIFIED: ICD-10-CM

## 2025-02-19 DIAGNOSIS — I87.2 VENOUS INSUFFICIENCY (CHRONIC) (PERIPHERAL): ICD-10-CM

## 2025-02-19 PROCEDURE — 99214 OFFICE O/P EST MOD 30 MIN: CPT

## 2025-05-15 ENCOUNTER — RX RENEWAL (OUTPATIENT)
Age: 89
End: 2025-05-15